# Patient Record
Sex: FEMALE | Race: WHITE | Employment: OTHER | URBAN - METROPOLITAN AREA
[De-identification: names, ages, dates, MRNs, and addresses within clinical notes are randomized per-mention and may not be internally consistent; named-entity substitution may affect disease eponyms.]

---

## 2017-04-12 PROBLEM — D17.21 LIPOMA OF RIGHT UPPER EXTREMITY: Status: ACTIVE | Noted: 2017-04-12

## 2017-04-12 PROBLEM — L98.9 SKIN ABNORMALITIES: Status: ACTIVE | Noted: 2017-04-12

## 2017-04-12 PROBLEM — I10 ESSENTIAL HYPERTENSION WITH GOAL BLOOD PRESSURE LESS THAN 130/85: Status: ACTIVE | Noted: 2017-04-12

## 2017-04-13 ENCOUNTER — HOSPITAL ENCOUNTER (OUTPATIENT)
Dept: MAMMOGRAPHY | Age: 72
Discharge: HOME | End: 2017-04-13
Attending: INTERNAL MEDICINE
Payer: MEDICARE

## 2017-04-13 DIAGNOSIS — Z12.31 VISIT FOR SCREENING MAMMOGRAM: ICD-10-CM

## 2017-04-13 DIAGNOSIS — Z80.3 FAMILY HISTORY OF BREAST CANCER IN FIRST DEGREE RELATIVE: ICD-10-CM

## 2017-04-13 PROCEDURE — 77067 SCR MAMMO BI INCL CAD: CPT

## 2017-06-13 ENCOUNTER — APPOINTMENT (RX ONLY)
Dept: URBAN - METROPOLITAN AREA CLINIC 23 | Facility: CLINIC | Age: 72
Setting detail: DERMATOLOGY
End: 2017-06-13

## 2017-06-13 DIAGNOSIS — D18.0 HEMANGIOMA: ICD-10-CM

## 2017-06-13 DIAGNOSIS — L82.1 OTHER SEBORRHEIC KERATOSIS: ICD-10-CM

## 2017-06-13 DIAGNOSIS — D22 MELANOCYTIC NEVI: ICD-10-CM

## 2017-06-13 DIAGNOSIS — D49.2 NEOPLASM OF UNSPECIFIED BEHAVIOR OF BONE, SOFT TISSUE, AND SKIN: ICD-10-CM

## 2017-06-13 PROBLEM — E78.5 HYPERLIPIDEMIA, UNSPECIFIED: Status: ACTIVE | Noted: 2017-06-13

## 2017-06-13 PROBLEM — D18.01 HEMANGIOMA OF SKIN AND SUBCUTANEOUS TISSUE: Status: ACTIVE | Noted: 2017-06-13

## 2017-06-13 PROBLEM — D22.61 MELANOCYTIC NEVI OF RIGHT UPPER LIMB, INCLUDING SHOULDER: Status: ACTIVE | Noted: 2017-06-13

## 2017-06-13 PROBLEM — L85.3 XEROSIS CUTIS: Status: ACTIVE | Noted: 2017-06-13

## 2017-06-13 PROBLEM — L55.1 SUNBURN OF SECOND DEGREE: Status: ACTIVE | Noted: 2017-06-13

## 2017-06-13 PROBLEM — D22.39 MELANOCYTIC NEVI OF OTHER PARTS OF FACE: Status: ACTIVE | Noted: 2017-06-13

## 2017-06-13 PROBLEM — I10 ESSENTIAL (PRIMARY) HYPERTENSION: Status: ACTIVE | Noted: 2017-06-13

## 2017-06-13 PROBLEM — D23.71 OTHER BENIGN NEOPLASM OF SKIN OF RIGHT LOWER LIMB, INCLUDING HIP: Status: ACTIVE | Noted: 2017-06-13

## 2017-06-13 PROBLEM — E03.9 HYPOTHYROIDISM, UNSPECIFIED: Status: ACTIVE | Noted: 2017-06-13

## 2017-06-13 PROBLEM — J30.1 ALLERGIC RHINITIS DUE TO POLLEN: Status: ACTIVE | Noted: 2017-06-13

## 2017-06-13 PROCEDURE — ? BIOPSY BY SHAVE METHOD

## 2017-06-13 PROCEDURE — ? OTHER

## 2017-06-13 PROCEDURE — 11100: CPT

## 2017-06-13 PROCEDURE — ? COUNSELING

## 2017-06-13 PROCEDURE — 99202 OFFICE O/P NEW SF 15 MIN: CPT | Mod: 25

## 2017-06-13 ASSESSMENT — LOCATION DETAILED DESCRIPTION DERM
LOCATION DETAILED: LEFT SUPERIOR LATERAL NECK
LOCATION DETAILED: RIGHT MEDIAL UPPER BACK
LOCATION DETAILED: RIGHT POSTERIOR ANKLE
LOCATION DETAILED: RIGHT ANTERIOR DISTAL UPPER ARM
LOCATION DETAILED: PERIUMBILICAL SKIN
LOCATION DETAILED: LEFT ANTERIOR DISTAL UPPER ARM
LOCATION DETAILED: RIGHT DISTAL POSTERIOR UPPER ARM
LOCATION DETAILED: STERNAL NOTCH
LOCATION DETAILED: LEFT DISTAL POSTERIOR UPPER ARM
LOCATION DETAILED: RIGHT LATERAL TRAPEZIAL NECK
LOCATION DETAILED: LEFT MID-UPPER BACK
LOCATION DETAILED: LEFT POSTERIOR ANKLE
LOCATION DETAILED: GLABELLA
LOCATION DETAILED: LEFT INFERIOR MEDIAL MIDBACK

## 2017-06-13 ASSESSMENT — LOCATION SIMPLE DESCRIPTION DERM
LOCATION SIMPLE: LEFT UPPER ARM
LOCATION SIMPLE: RIGHT POSTERIOR UPPER ARM
LOCATION SIMPLE: CHEST
LOCATION SIMPLE: RIGHT UPPER ARM
LOCATION SIMPLE: LEFT LOWER BACK
LOCATION SIMPLE: GLABELLA
LOCATION SIMPLE: ABDOMEN
LOCATION SIMPLE: POSTERIOR NECK
LOCATION SIMPLE: LEFT ANKLE
LOCATION SIMPLE: NECK
LOCATION SIMPLE: LEFT UPPER BACK
LOCATION SIMPLE: RIGHT ANKLE
LOCATION SIMPLE: RIGHT UPPER BACK
LOCATION SIMPLE: LEFT POSTERIOR UPPER ARM

## 2017-06-13 ASSESSMENT — LOCATION ZONE DERM
LOCATION ZONE: ARM
LOCATION ZONE: TRUNK
LOCATION ZONE: NECK
LOCATION ZONE: LEG
LOCATION ZONE: FACE

## 2017-06-13 NOTE — PROCEDURE: OTHER
Detail Level: Zone
Other (Free Text): Offered biopsy today, pt declined. She has been using curling iron. She wanted to do wound care w vaseline. If not resolved in a month, she is to call and schedule biopsy
Note Text (......Xxx Chief Complaint.): This diagnosis correlates with the
Other (Free Text): Laser removal with Coco if patient desires

## 2017-06-13 NOTE — PROCEDURE: BIOPSY BY SHAVE METHOD
Bill 89579 For Specimen Handling/Conveyance To Laboratory?: no
Anesthesia Type: 1% lidocaine with 1:200,000 epinephrine and a 1:10 solution of 8.4% sodium bicarbonate
Notification Instructions: Patient will be notified of biopsy results. However, patient instructed to call the office if not contacted within 2 weeks.
Post-Care Instructions: I reviewed with the patient in detail post-care instructions. Patient is to keep the biopsy site dry overnight, and then apply vaseline twice daily until healed. Patient may apply hydrogen peroxide soaks to remove any crusting. Patient given a wound care sheet.
Wound Care: Vaseline
Additional Anesthesia Volume In Cc (Will Not Render If 0): 0
Render Post-Care Instructions In Note?: yes
Biopsy Method: Personna blade
Cryotherapy Text: The wound bed was treated with cryotherapy after the biopsy was performed.
Anesthesia Volume In Cc: 0.1
Dressing: bandage
Detail Level: Detailed
Curettage Text: The wound bed was treated with curettage after the biopsy was performed.
Type Of Destruction Used: Curettage
Electrodesiccation Text: The wound bed was treated with electrodesiccation after the biopsy was performed.
Silver Nitrate Text: The wound bed was treated with silver nitrate after the biopsy was performed.
Electrodesiccation And Curettage Text: The wound bed was treated with electrodesiccation and curettage after the biopsy was performed.
Hemostasis: Aluminum Chloride
Accession #: [id number]
Billing Type: Third-Party Bill
Biopsy Type: H and E
Consent: Written consent was obtained and risks were reviewed including but not limited to scarring, infection, bleeding, scabbing, incomplete removal, nerve damage and allergy to anesthesia.

## 2017-10-11 PROBLEM — I10 ESSENTIAL HYPERTENSION WITH GOAL BLOOD PRESSURE LESS THAN 130/85: Status: RESOLVED | Noted: 2017-04-12 | Resolved: 2017-10-11

## 2017-10-11 PROBLEM — D17.21 LIPOMA OF RIGHT UPPER EXTREMITY: Status: RESOLVED | Noted: 2017-04-12 | Resolved: 2017-10-11

## 2017-10-11 PROBLEM — L98.9 SKIN ABNORMALITIES: Status: RESOLVED | Noted: 2017-04-12 | Resolved: 2017-10-11

## 2018-04-17 PROBLEM — F51.04 CHRONIC INSOMNIA: Status: ACTIVE | Noted: 2018-04-17

## 2018-04-17 PROBLEM — I10 ESSENTIAL HYPERTENSION WITH GOAL BLOOD PRESSURE LESS THAN 130/85: Status: ACTIVE | Noted: 2018-04-17

## 2018-04-17 PROBLEM — N32.81 OAB (OVERACTIVE BLADDER): Status: ACTIVE | Noted: 2018-04-17

## 2019-11-22 ENCOUNTER — HOSPITAL ENCOUNTER (OUTPATIENT)
Dept: MAMMOGRAPHY | Age: 74
Discharge: HOME | End: 2019-11-22
Attending: INTERNAL MEDICINE
Payer: MEDICARE

## 2019-11-22 DIAGNOSIS — Z12.31 VISIT FOR SCREENING MAMMOGRAM: ICD-10-CM

## 2019-11-22 PROCEDURE — 77063 BREAST TOMOSYNTHESIS BI: CPT

## 2019-11-25 NOTE — PROGRESS NOTES
Please make sure patient has additional imaging scheduled. I will also place an order for breast mri to be done as well.       Hayes Landry MD

## 2019-12-02 ENCOUNTER — HOSPITAL ENCOUNTER (OUTPATIENT)
Dept: MAMMOGRAPHY | Age: 74
Discharge: HOME | End: 2019-12-02
Attending: INTERNAL MEDICINE
Payer: MEDICARE

## 2019-12-02 DIAGNOSIS — R92.8 ABNORMAL SCREENING MAMMOGRAM: ICD-10-CM

## 2019-12-02 DIAGNOSIS — Z80.3 FH: BREAST CANCER: ICD-10-CM

## 2019-12-02 PROCEDURE — 76642 ULTRASOUND BREAST LIMITED: CPT

## 2019-12-02 PROCEDURE — 77065 DX MAMMO INCL CAD UNI: CPT

## 2019-12-09 ENCOUNTER — HOSPITAL ENCOUNTER (OUTPATIENT)
Dept: MAMMOGRAPHY | Age: 74
Discharge: HOME | End: 2019-12-09
Attending: INTERNAL MEDICINE
Payer: MEDICARE

## 2019-12-09 VITALS — HEART RATE: 68 BPM | DIASTOLIC BLOOD PRESSURE: 84 MMHG | SYSTOLIC BLOOD PRESSURE: 170 MMHG

## 2019-12-09 DIAGNOSIS — R92.8 ABNORMAL MAMMOGRAM: ICD-10-CM

## 2019-12-09 DIAGNOSIS — N63.10 BREAST MASS, RIGHT: ICD-10-CM

## 2019-12-09 DIAGNOSIS — R92.8 ABNORMAL FINDING ON MAMMOGRAPHY: ICD-10-CM

## 2019-12-09 PROCEDURE — 88305 TISSUE EXAM BY PATHOLOGIST: CPT

## 2019-12-09 PROCEDURE — 88361 TUMOR IMMUNOHISTOCHEM/COMPUT: CPT

## 2019-12-09 PROCEDURE — 19084 BX BREAST ADD LESION US IMAG: CPT

## 2019-12-09 PROCEDURE — 74011000250 HC RX REV CODE- 250: Performed by: INTERNAL MEDICINE

## 2019-12-09 PROCEDURE — 77065 DX MAMMO INCL CAD UNI: CPT

## 2019-12-09 PROCEDURE — 19083 BX BREAST 1ST LESION US IMAG: CPT

## 2019-12-09 RX ORDER — LIDOCAINE HYDROCHLORIDE 10 MG/ML
7 INJECTION INFILTRATION; PERINEURAL
Status: COMPLETED | OUTPATIENT
Start: 2019-12-09 | End: 2019-12-09

## 2019-12-09 RX ADMIN — LIDOCAINE HYDROCHLORIDE 7 ML: 10 INJECTION, SOLUTION INFILTRATION; PERINEURAL at 10:03

## 2019-12-11 NOTE — PROGRESS NOTES
Dr. Nkechi Kraft and I met with Mrs. Roxann Chung and her  to give her results to her of her right breast biopsy. Results came back as DCIS of one area and second area is a papilloma. She will have her MRI on 12/19 at 11 and see Dr. Hipolito Doshi on 12/30 at 845. She was doing well after the biopsy and was given a breast cancer folder with her appts and other information along with the navigators phone number.

## 2019-12-19 ENCOUNTER — HOSPITAL ENCOUNTER (OUTPATIENT)
Dept: MRI IMAGING | Age: 74
Discharge: HOME | End: 2019-12-19
Attending: INTERNAL MEDICINE
Payer: MEDICARE

## 2019-12-19 DIAGNOSIS — R92.8 MAMMOGRAM ABNORMAL: ICD-10-CM

## 2019-12-19 DIAGNOSIS — Z80.3 FAMILY HISTORY OF BREAST CANCER IN FIRST DEGREE RELATIVE: ICD-10-CM

## 2019-12-19 DIAGNOSIS — Z12.39 BREAST CANCER SCREENING, HIGH RISK PATIENT: ICD-10-CM

## 2019-12-19 PROCEDURE — 74011000258 HC RX REV CODE- 258: Performed by: INTERNAL MEDICINE

## 2019-12-19 PROCEDURE — 74011250636 HC RX REV CODE- 250/636: Performed by: INTERNAL MEDICINE

## 2019-12-19 PROCEDURE — 77049 MRI BREAST C-+ W/CAD BI: CPT

## 2019-12-19 PROCEDURE — A9575 INJ GADOTERATE MEGLUMI 0.1ML: HCPCS | Performed by: INTERNAL MEDICINE

## 2019-12-19 RX ORDER — GADOTERATE MEGLUMINE 376.9 MG/ML
16 INJECTION INTRAVENOUS
Status: COMPLETED | OUTPATIENT
Start: 2019-12-19 | End: 2019-12-19

## 2019-12-19 RX ORDER — SODIUM CHLORIDE 0.9 % (FLUSH) 0.9 %
10 SYRINGE (ML) INJECTION
Status: COMPLETED | OUTPATIENT
Start: 2019-12-19 | End: 2019-12-19

## 2019-12-19 RX ADMIN — SODIUM CHLORIDE 100 ML: 900 INJECTION, SOLUTION INTRAVENOUS at 11:14

## 2019-12-19 RX ADMIN — Medication 10 ML: at 11:14

## 2019-12-19 RX ADMIN — GADOTERATE MEGLUMINE 16 ML: 376.9 INJECTION INTRAVENOUS at 11:14

## 2019-12-30 PROBLEM — D05.11 DUCTAL CARCINOMA IN SITU (DCIS) OF RIGHT BREAST: Status: ACTIVE | Noted: 2019-12-30

## 2019-12-30 PROBLEM — D24.1 INTRADUCTAL PAPILLOMA OF RIGHT BREAST: Status: ACTIVE | Noted: 2019-12-30

## 2019-12-31 RX ORDER — SODIUM CHLORIDE 0.9 % (FLUSH) 0.9 %
5-40 SYRINGE (ML) INJECTION AS NEEDED
Status: CANCELLED | OUTPATIENT
Start: 2019-12-31

## 2019-12-31 RX ORDER — SODIUM CHLORIDE 0.9 % (FLUSH) 0.9 %
5-40 SYRINGE (ML) INJECTION EVERY 8 HOURS
Status: CANCELLED | OUTPATIENT
Start: 2019-12-31

## 2020-01-14 ENCOUNTER — HOSPITAL ENCOUNTER (OUTPATIENT)
Dept: LAB | Age: 75
Discharge: HOME | End: 2020-01-14
Payer: MEDICARE

## 2020-01-14 DIAGNOSIS — D05.11 DUCTAL CARCINOMA IN SITU (DCIS) OF RIGHT BREAST: ICD-10-CM

## 2020-01-14 DIAGNOSIS — Z85.3 PERSONAL HISTORY OF BREAST CANCER: ICD-10-CM

## 2020-01-14 DIAGNOSIS — Z00.6 RESEARCH EXAM: ICD-10-CM

## 2020-01-14 DIAGNOSIS — Z80.3 FAMILY HISTORY OF BREAST CANCER: ICD-10-CM

## 2020-01-14 LAB
Lab: NORMAL
Lab: NORMAL
REFERENCE LAB,REFLB: NORMAL
REFERENCE LAB,REFLB: NORMAL
TEST DESCRIPTION:,ATST: NORMAL
TEST DESCRIPTION:,ATST: NORMAL

## 2020-01-14 PROCEDURE — 36415 COLL VENOUS BLD VENIPUNCTURE: CPT

## 2020-02-19 RX ORDER — SODIUM CHLORIDE 0.9 % (FLUSH) 0.9 %
5-40 SYRINGE (ML) INJECTION EVERY 8 HOURS
Status: CANCELLED | OUTPATIENT
Start: 2020-02-19

## 2020-02-19 RX ORDER — SODIUM CHLORIDE 0.9 % (FLUSH) 0.9 %
5-40 SYRINGE (ML) INJECTION AS NEEDED
Status: CANCELLED | OUTPATIENT
Start: 2020-02-19

## 2020-02-26 ENCOUNTER — HOSPITAL ENCOUNTER (OUTPATIENT)
Dept: SURGERY | Age: 75
Discharge: HOME | End: 2020-02-26

## 2020-03-02 VITALS — HEIGHT: 70 IN | WEIGHT: 170 LBS | BODY MASS INDEX: 24.34 KG/M2

## 2020-03-02 NOTE — PERIOP NOTES
Patient verified name and . Order for consent found in EHR and matches case posting; patient verifies procedure. RIGHT BREAST LUMPECTOMY WITH NEEDLE LOCALIZATION X 2    Type 1B surgery, PAT phone assessment complete. Orders received. Labs per surgeon: none. Labs per anesthesia protocol: none. Patient answered medical/surgical history questions at their best of ability. All prior to admission medications documented in [facility]. Patient instructed to take the following medications the day of surgery according to anesthesia guidelines with a small sip of water: none. Hold all vitamins/supplements 7 days prior to surgery and NSAIDS 5 days prior to surgery. Prescription meds to hold:ibuprofen. Patient instructed on the following:  Arrive at A Entrance, time of arrival to be called the day before by 1700  NPO after midnight including gum, mints, and ice chips  Responsible adult must drive patient to the hospital, stay during surgery, and patient will need supervision 24 hours after anesthesia  Use antibacterial soap in shower the night before surgery and on the morning of surgery  All piercings must be removed prior to arrival.    Leave all valuables (money and jewelry) at home but bring insurance card and ID on  DOS. Do not wear make-up, nail polish, lotions, cologne, perfumes, powders, or oil on skin. Patient teach back successful and patient demonstrates knowledge of instruction.

## 2020-03-03 ENCOUNTER — ANESTHESIA EVENT (OUTPATIENT)
Dept: SURGERY | Age: 75
End: 2020-03-03
Payer: MEDICARE

## 2020-03-03 NOTE — H&P
H&P/Consult Note/Progress Note/Office Note:   Ayo Garcia  MRN: [id number]  :1945  Age:74 y.o.    HPI: Ayo Garcia is a 69 y.o. female who is here for right breast partial masectomy (NL lumpectomy) for DCIS at 9:00 and a separate NL lumpectomy at 8:00 for a separate papilloma on 3/4/20. She presented with an abnormal right breast screening mammogram with an asymmetry which led to further diagnostic imaging   and 2 percutaneous biopsies identifying right breast DCIS and a separate right breast papilloma. This is shown in the expanded problem list below and was followed by breast MRI. No current breast complaints.   She is s/p benign left breast biopsy many yrs ago at another facility        She has a family history significant for breast carcinoma (mother 67; sister 62)  20 Ayo Garcia genetic testing            Past Medical History:   Diagnosis Date    Abnormal LFTs (liver function tests)     Breast cancer ([address]     Right     Dyslipidemia     Hyperlipidemia     Hypertension     controlled with meds    Hypothyroidism     levothyroxine daily    Urgency of urination     Varicose veins 12     Past Surgical History:   Procedure Laterality Date    BREAST SURGERY PROCEDURE UNLISTED      cysts removed x 2 left breast- benign    HX BREAST BIOPSY Right 2019    2 areas    HX COLONOSCOPY  12/10/2012 done    [facility]---7-10 yr recall    HX DILATION AND CURETTAGE      HX TONSIL AND ADENOIDECTOMY  as child    HX TUBAL LIGATION      VASCULAR SURGERY PROCEDURE UNLIST      Varicose vein removal     Current Facility-Administered Medications   Medication Dose Route Frequency    lidocaine (XYLOCAINE) 10 mg/mL (1 %) injection 0.1 mL  0.1 mL SubCUTAneous PRN    lactated Ringers infusion  75 mL/hr IntraVENous CONTINUOUS    fentaNYL citrate (PF) injection 100 mcg  100 mcg IntraVENous ONCE    midazolam (VERSED) injection 2 mg  2 mg IntraVENous ONCE    midazolam (VERSED) injection 2 mg  2 mg IntraVENous ONCE    ceFAZolin (ANCEF) 2 g/20 mL in sterile water IV syringe  2 g IntraVENous ONCE    sodium chloride (NS) flush 5-40 mL  5-40 mL IntraVENous Q8H    sodium chloride (NS) flush 5-40 mL  5-40 mL IntraVENous PRN     Patient has no known allergies. Social History     Socioeconomic History    Marital status:      Spouse name: Not on file    Number of children: Not on file    Years of education: Not on file    Highest education level: Not on file   Tobacco Use    Smoking status: Former Smoker     Packs/day: 0.50     Years: 20.00     Pack years: 10.00     Last attempt to quit: 1990     Years since quittin.1    Smokeless tobacco: Never Used   Substance and Sexual Activity    Alcohol use: No     Comment: rarely    Drug use: No    Sexual activity: Yes     Partners: Female, Male     Social History     Tobacco Use   Smoking Status Former Smoker    Packs/day: 0.50    Years: 20.00    Pack years: 10.00    Last attempt to quit: 1990    Years since quittin.1   Smokeless Tobacco Never Used     Family History   Problem Relation Age of Onset    Cancer Mother         breast and rectal cancer, passed at age 80    Stroke Mother     Diabetes Mother     Breast Cancer Mother 67    Heart Disease Father 50        mi- massive    Heart Attack Father     Cancer Sister         breast    Breast Cancer Sister 62    Heart Disease Brother         mi    Hypertension Brother     Diabetes Brother      ROS: The patient has no difficulty with chest pain or shortness of breath. No fever or chills. Comprehensive review of systems was otherwise unremarkable except as noted above.     Physical Exam:   Visit Vitals  /80   Pulse 64   Temp 97.7 °F (36.5 °C)   Resp 17   Wt 172 lb 4.8 oz (78.2 kg)   SpO2 96%   BMI 24.72 kg/m²     Vitals:    20 0902   BP: 150/80   Pulse: 64   Resp: 17   Temp: 97.7 °F (36.5 °C)   SpO2: 96%   Weight: 172 lb 4.8 oz (78.2 kg) [unfilled]  [unfilled]    Constitutional: Alert, oriented, cooperative patient in no acute distress; appears stated age    Eyes:Sclera are clear. EOMs intact  ENMT: no external lesions gross hearing normal; no obvious neck masses, no ear or lip lesions, nares normal  CV: RRR. Normal perfusion  Resp: No JVD. Breathing is  non-labored; no audible wheezing. No palpable breast masses on either side  No regional adenopathy     GI: soft and non-distended     Musculoskeletal: unremarkable with normal function. No embolic signs or cyanosis. Neuro:  Oriented; moves all 4; no focal deficits  Psychiatric: normal affect and mood, no memory impairment    Recent vitals (if inpt):  @IPVITALS(24:)@    Labs:  No results for input(s): WBC, HGB, PLT, NA, K, CL, CO2, BUN, CREA, GLU, PTP, INR, APTT, TBIL, TBILI, CBIL, SGOT, GPT, ALT, AP, AML, AML, LPSE, LCAD, NH4, TROPT, TROIQ, PCO2, PO2, HCO3, HGBEXT, PLTEXT, INREXT, HGBEXT, PLTEXT, INREXT in the last 72 hours. No lab exists for component:  PH    Lab Results   Component Value Date/Time    WBC 7.3 10/25/2019 08:04 AM    HGB 15.6 10/25/2019 08:04 AM    PLATELET 545 80/23/4596 08:04 AM    Sodium 142 10/25/2019 08:04 AM    Potassium 4.4 10/25/2019 08:04 AM    Chloride 101 10/25/2019 08:04 AM    CO2 28 10/25/2019 08:04 AM    BUN 14 10/25/2019 08:04 AM    Creatinine 0.92 10/25/2019 08:04 AM    Glucose 91 10/25/2019 08:04 AM    Bilirubin, total 0.7 10/25/2019 08:04 AM    Bilirubin, direct 0.17 09/17/2013 10:10 AM    AST (SGOT) 20 10/25/2019 08:04 AM    ALT (SGPT) 20 10/25/2019 08:04 AM    Alk. phosphatase 82 10/25/2019 08:04 AM       CT Results  (Last 48 hours)    None        chest X-ray      I reviewed recent labs, recent radiologic studies, and pertinent records including other doctor notes if needed. I independently reviewed radiology images for studies I described above or studies I have ordered.    Admission date (for inpatients): 3/4/2020   [unfilled] [unfilled]    ASSESSMENT/PLAN:  Problem List  Date Reviewed: 12/30/2019          Codes Class Noted    Ductal carcinoma in situ (DCIS) of right breast ICD-10-CM: D05.11  ICD-9-CM: 233.0  12/30/2019    Overview Addendum 1/2/2020  7:01 AM by MD Edith Suazois, Right Breast DCIS      11/22/19 bilat screening mammo  Hx:   Remote benign left biopsy and a strong family history of breast cancer   give the patient a persistent lifetime risk of 20% by the Adonna Fend model.     Heterogeneously dense fibroglandular tissue bilaterally. Superficial asymmetry posteriorly in the  right upper outer quadrant and a small nodular asymmetry centrally in the right  lower outer quadrant appear new from earlier studies and are incompletely  evaluated on these standard views. No other definite evidence for malignancy  is seen elsewhere in either breast.     IMPRESSION:       1.  RIGHT POSTERIOR UPPER OUTER QUADRANT SUPERFICIAL ASYMMETRY AND LOWER OUTER  QUADRANT CENTRAL NODULAR ASYMMETRY SHOULD BE FURTHER EVALUATED WITH STRAIGHT  LATERAL AND SPOT COMPRESSION VIEWS AS WELL AS POSSIBLE ULTRASOUND AT THIS TIME.     2. HIGH-RISK SCREENING BREAST MRI SHOULD ALSO BE CONSIDERED AT THIS TIME IN  FOLLOWUP OF THE PRIOR STUDY IN 2014 FOR THIS PATIENT WITH DENSE BREAST  PARENCHYMA AND A PERSISTENT 20% LIFETIME BREAST CANCER RISK (SEE ABOVE    12/2/19 right breast dx mammo and US  RIGHT MAMMOGRAM:  Straight lateral and spot compression views are compared with  November 22, 2019 and earlier studies including MRI of December 22, 2014. They  confirm the presence of a small nodule at the 8:00 position 4 cm from the nipple  at mid-depth from the skin. There is also focal asymmetry superficially at the  posterior 9:00 position which persists with spot compression.     RIGHT BREAST ULTRASOUND:    6 mm microlobulated hypoechoic nodule at the 8:00 position 4 cm from the nipple. It is taller than wide, which is suspicious for malignancy.   Biopsy is indicated. At the 9:00 position 6 cm from the nipple, there is an 8 mm lobular hypoechoic nodule   in the superficial fat associated with subtle acoustical shadowing. It appears new from prior mammogram in 2017, and the possibility of malignancy is suggested. Biopsy is also indicated.     IMPRESSION:    SUBCENTIMETER 8:00 AND SUPERFICIAL POSTERIOR 9:00 NODULES ARE SUSPICIOUS FOR  MALIGNANCY, AND BIOPSIES ARE RECOMMENDED.         12/9/19 US-guided right breast biopsy x2   DIAGNOSIS   A: \"RIGHT BREAST, 9:00 POSITION, 6 CM FROM NIPPLE, CORE BIOPSY\":   DUCTAL CARCINOMA IN SITU, INTERMEDIATE GRADE (CRIBRIFORM TYPE)   B: \"RIGHT BREAST, 8:00 POSITION, 4 CM FROM NIPPLE, CORE BIOPSY\":   FIBROCYSTIC MASTOPATHY HAVING FOCALLY ATYPICAL INTRADUCTAL PAPILLOMA   Electronically signed out on 12/10/2019 06:58 by NADRY Batista M.D.   ER: Positive (100%); MD: Positive (99%)   See full report in [facility] as errors can occur when results are embedded into this report. Preliminary ultrasound evaluation of the right axilla demonstrated no pathologically enlarged or dysmorphic nodes. Post-bx mammo demonstrates the biopsy marker clips in expected positions. 12/19/19 Breast MRI  The breasts demonstrate moderate background glandularity and mild enhancement. Innumerable tiny scattered enhancing foci are noted on each side.     Right biopsy clip artifact is noted at 8:00 middle depth and 9:00 posterior depth. The 8:00 slight demonstrates only minimal enhancement surrounding the biopsy site.    At 9:00 there is 1.2 cm of clumped nonmass enhancement consistent with DCIS.     There is no other evidence of suspicious enhancing mass or dominant nonmass  enhancement to suggest malignancy elsewhere in either breast.   No evidence of axillary or internal mammary lymphadenopathy.     Elsewhere, limited visualization of the partially included thorax and upper  abdomen shows no concerning findings.      IMPRESSION: 1. Right 9:00 DCIS site demonstrates 1.2 cm of enhancement surrounding the clip. 2. Right 8:00 atypical papilloma site demonstrates only minimal residual enhancement. 3. No suspicious finding otherwise in either breast. No lymphadenopathy.     This patient would benefit from yearly supplemental MRI in the future, as long as she is able,  given increased risk and dense breast parenchyma. 1/2/20 presented at [facility]; offer genetics referral; Right lumpectomy x 2 (DCIS and papilloma biopsy site) unless genetics done and + for mutation                     Intraductal papilloma of right breast ICD-10-CM: D24.1  ICD-9-CM: 217  12/30/2019        Chronic insomnia ICD-10-CM: F51.04  ICD-9-CM: 780.52  4/17/2018        OAB (overactive bladder) ICD-10-CM: N32.81  ICD-9-CM: 596.51  4/17/2018        Essential hypertension with goal blood pressure less than 130/85 ICD-10-CM: I10  ICD-9-CM: 401.9  4/17/2018        Family history of breast cancer in first degree relative ICD-10-CM: Z80.3  ICD-9-CM: V16.3  4/3/2014        Hypertension ICD-10-CM: I10  ICD-9-CM: 401.9  Unknown    Overview Signed 7/12/2013  1:23 PM by Cherie Berrios RN     controlled with meds             Hypothyroidism ICD-10-CM: E03.9  ICD-9-CM: 244.9  Unknown    Overview Signed 7/12/2013  1:23 PM by Cherie Berrios RN     levothyroxine daily             Dyslipidemia ICD-10-CM: E78.5  ICD-9-CM: 272.4  Unknown            Active Problems:    * No active hospital problems. *           She is here for right breast partial masectomy (NL lumpectomy) for DCIS at 9:00   as well as a separate NL lumpectomy at 8:00 for a separate papilloma on 3/4/20. Right Breast DCIS and separate right breast papilloma  We had a long discussion in the office today about her 2 conditions. We discussed treatment options. We discussed mastectomy/sent node vs partial mastectomy/XRT  We discussed risks/benefits and advantages/disadvantages of each.   She wants breast preservation surgery. We plan breast preservation surgery right breast partial mastectomy using NL for the right breast DCIS   and separate NL lumpectomy for the papilloma biopsy site. We discussed that if invasive breast carcinoma is identified in either excision site, she will need subsequent sent node excision                  I have personally performed a face-to-face diagnostic evaluation and management  service on this patient. I have independently seen the patient. I have independently obtained the above history from the patient/family. I have independently examined the patient with above findings. I have independently reviewed data/labs for this patient and developed the above plan of care (MDM). Signed: Miguel Ángel Molina.  Ace Pandey MD, FACS

## 2020-03-04 ENCOUNTER — HOSPITAL ENCOUNTER (OUTPATIENT)
Age: 75
Setting detail: OUTPATIENT SURGERY
Discharge: HOME | End: 2020-03-04
Attending: SURGERY | Admitting: SURGERY
Payer: MEDICARE

## 2020-03-04 ENCOUNTER — ANESTHESIA (OUTPATIENT)
Dept: SURGERY | Age: 75
End: 2020-03-04
Payer: MEDICARE

## 2020-03-04 ENCOUNTER — APPOINTMENT (OUTPATIENT)
Dept: MAMMOGRAPHY | Age: 75
End: 2020-03-04
Attending: SURGERY
Payer: MEDICARE

## 2020-03-04 VITALS
WEIGHT: 172.3 LBS | RESPIRATION RATE: 16 BRPM | SYSTOLIC BLOOD PRESSURE: 159 MMHG | OXYGEN SATURATION: 97 % | BODY MASS INDEX: 24.72 KG/M2 | TEMPERATURE: 97.5 F | HEART RATE: 62 BPM | DIASTOLIC BLOOD PRESSURE: 76 MMHG

## 2020-03-04 DIAGNOSIS — N63.10 BREAST MASS, RIGHT: ICD-10-CM

## 2020-03-04 DIAGNOSIS — D05.11 DUCTAL CARCINOMA IN SITU (DCIS) OF RIGHT BREAST: ICD-10-CM

## 2020-03-04 PROCEDURE — 77030020782 HC GWN BAIR PAWS FLX 3M -B: Performed by: ANESTHESIOLOGY

## 2020-03-04 PROCEDURE — 76010000160 HC OR TIME 0.5 TO 1 HR INTENSV-TIER 1: Performed by: SURGERY

## 2020-03-04 PROCEDURE — 74011250636 HC RX REV CODE- 250/636: Performed by: ANESTHESIOLOGY

## 2020-03-04 PROCEDURE — 19286 PERQ DEV BREAST ADD US IMAG: CPT

## 2020-03-04 PROCEDURE — 74011000250 HC RX REV CODE- 250: Performed by: SURGERY

## 2020-03-04 PROCEDURE — 76210000020 HC REC RM PH II FIRST 0.5 HR: Performed by: SURGERY

## 2020-03-04 PROCEDURE — 19285 PERQ DEV BREAST 1ST US IMAG: CPT

## 2020-03-04 PROCEDURE — 77065 DX MAMMO INCL CAD UNI: CPT

## 2020-03-04 PROCEDURE — 77030018836 HC SOL IRR NACL ICUM -A: Performed by: SURGERY

## 2020-03-04 PROCEDURE — 74011250637 HC RX REV CODE- 250/637: Performed by: ANESTHESIOLOGY

## 2020-03-04 PROCEDURE — 74011250636 HC RX REV CODE- 250/636: Performed by: NURSE ANESTHETIST, CERTIFIED REGISTERED

## 2020-03-04 PROCEDURE — 77030008462 HC STPLR SKN PROX J&J -A: Performed by: SURGERY

## 2020-03-04 PROCEDURE — 77030010509 HC AIRWY LMA MSK TELE -A: Performed by: ANESTHESIOLOGY

## 2020-03-04 PROCEDURE — 77030003029 HC SUT VCRL J&J -B: Performed by: SURGERY

## 2020-03-04 PROCEDURE — 88307 TISSUE EXAM BY PATHOLOGIST: CPT

## 2020-03-04 PROCEDURE — 76210000063 HC OR PH I REC FIRST 0.5 HR: Performed by: SURGERY

## 2020-03-04 PROCEDURE — 77030040361 HC SLV COMPR DVT MDII -B: Performed by: SURGERY

## 2020-03-04 PROCEDURE — 76060000032 HC ANESTHESIA 0.5 TO 1 HR: Performed by: SURGERY

## 2020-03-04 PROCEDURE — 77030010512 HC APPL CLP LIG J&J -C: Performed by: SURGERY

## 2020-03-04 PROCEDURE — 88305 TISSUE EXAM BY PATHOLOGIST: CPT

## 2020-03-04 PROCEDURE — 74011000250 HC RX REV CODE- 250: Performed by: NURSE ANESTHETIST, CERTIFIED REGISTERED

## 2020-03-04 PROCEDURE — 74011250636 HC RX REV CODE- 250/636: Performed by: SURGERY

## 2020-03-04 RX ORDER — PROPOFOL 10 MG/ML
INJECTION, EMULSION INTRAVENOUS AS NEEDED
Status: DISCONTINUED | OUTPATIENT
Start: 2020-03-04 | End: 2020-03-04 | Stop reason: HOSPADM

## 2020-03-04 RX ORDER — FAMOTIDINE 20 MG/1
20 TABLET, FILM COATED ORAL ONCE
Status: COMPLETED | OUTPATIENT
Start: 2020-03-04 | End: 2020-03-04

## 2020-03-04 RX ORDER — LIDOCAINE HYDROCHLORIDE 10 MG/ML
0.1 INJECTION INFILTRATION; PERINEURAL AS NEEDED
Status: DISCONTINUED | OUTPATIENT
Start: 2020-03-04 | End: 2020-03-04 | Stop reason: HOSPADM

## 2020-03-04 RX ORDER — SODIUM CHLORIDE 0.9 % (FLUSH) 0.9 %
5-40 SYRINGE (ML) INJECTION EVERY 8 HOURS
Status: DISCONTINUED | OUTPATIENT
Start: 2020-03-04 | End: 2020-03-04 | Stop reason: HOSPADM

## 2020-03-04 RX ORDER — SODIUM CHLORIDE, SODIUM LACTATE, POTASSIUM CHLORIDE, CALCIUM CHLORIDE 600; 310; 30; 20 MG/100ML; MG/100ML; MG/100ML; MG/100ML
75 INJECTION, SOLUTION INTRAVENOUS CONTINUOUS
Status: DISCONTINUED | OUTPATIENT
Start: 2020-03-04 | End: 2020-03-04 | Stop reason: HOSPADM

## 2020-03-04 RX ORDER — MIDAZOLAM HYDROCHLORIDE 1 MG/ML
2 INJECTION, SOLUTION INTRAMUSCULAR; INTRAVENOUS ONCE
Status: DISCONTINUED | OUTPATIENT
Start: 2020-03-04 | End: 2020-03-04 | Stop reason: HOSPADM

## 2020-03-04 RX ORDER — OXYCODONE HYDROCHLORIDE 5 MG/1
5 TABLET ORAL
Status: DISCONTINUED | OUTPATIENT
Start: 2020-03-04 | End: 2020-03-04 | Stop reason: HOSPADM

## 2020-03-04 RX ORDER — LIDOCAINE HYDROCHLORIDE 10 MG/ML
4 INJECTION INFILTRATION; PERINEURAL
Status: COMPLETED | OUTPATIENT
Start: 2020-03-04 | End: 2020-03-04

## 2020-03-04 RX ORDER — BUPIVACAINE HYDROCHLORIDE 2.5 MG/ML
INJECTION, SOLUTION EPIDURAL; INFILTRATION; INTRACAUDAL AS NEEDED
Status: DISCONTINUED | OUTPATIENT
Start: 2020-03-04 | End: 2020-03-04 | Stop reason: HOSPADM

## 2020-03-04 RX ORDER — HYDROMORPHONE HYDROCHLORIDE 2 MG/ML
0.5 INJECTION, SOLUTION INTRAMUSCULAR; INTRAVENOUS; SUBCUTANEOUS
Status: DISCONTINUED | OUTPATIENT
Start: 2020-03-04 | End: 2020-03-04 | Stop reason: HOSPADM

## 2020-03-04 RX ORDER — OXYCODONE HYDROCHLORIDE 5 MG/1
10 TABLET ORAL
Status: DISCONTINUED | OUTPATIENT
Start: 2020-03-04 | End: 2020-03-04 | Stop reason: HOSPADM

## 2020-03-04 RX ORDER — ONDANSETRON 2 MG/ML
INJECTION INTRAMUSCULAR; INTRAVENOUS AS NEEDED
Status: DISCONTINUED | OUTPATIENT
Start: 2020-03-04 | End: 2020-03-04 | Stop reason: HOSPADM

## 2020-03-04 RX ORDER — DEXAMETHASONE SODIUM PHOSPHATE 4 MG/ML
INJECTION, SOLUTION INTRA-ARTICULAR; INTRALESIONAL; INTRAMUSCULAR; INTRAVENOUS; SOFT TISSUE AS NEEDED
Status: DISCONTINUED | OUTPATIENT
Start: 2020-03-04 | End: 2020-03-04 | Stop reason: HOSPADM

## 2020-03-04 RX ORDER — LIDOCAINE HYDROCHLORIDE 20 MG/ML
INJECTION, SOLUTION EPIDURAL; INFILTRATION; INTRACAUDAL; PERINEURAL AS NEEDED
Status: DISCONTINUED | OUTPATIENT
Start: 2020-03-04 | End: 2020-03-04 | Stop reason: HOSPADM

## 2020-03-04 RX ORDER — SODIUM CHLORIDE 0.9 % (FLUSH) 0.9 %
5-40 SYRINGE (ML) INJECTION AS NEEDED
Status: DISCONTINUED | OUTPATIENT
Start: 2020-03-04 | End: 2020-03-04 | Stop reason: HOSPADM

## 2020-03-04 RX ORDER — CEFAZOLIN SODIUM/WATER 2 G/20 ML
2 SYRINGE (ML) INTRAVENOUS ONCE
Status: COMPLETED | OUTPATIENT
Start: 2020-03-04 | End: 2020-03-04

## 2020-03-04 RX ORDER — FENTANYL CITRATE 50 UG/ML
100 INJECTION, SOLUTION INTRAMUSCULAR; INTRAVENOUS ONCE
Status: DISCONTINUED | OUTPATIENT
Start: 2020-03-04 | End: 2020-03-04 | Stop reason: HOSPADM

## 2020-03-04 RX ORDER — FENTANYL CITRATE 50 UG/ML
INJECTION, SOLUTION INTRAMUSCULAR; INTRAVENOUS AS NEEDED
Status: DISCONTINUED | OUTPATIENT
Start: 2020-03-04 | End: 2020-03-04 | Stop reason: HOSPADM

## 2020-03-04 RX ADMIN — SODIUM CHLORIDE, SODIUM LACTATE, POTASSIUM CHLORIDE, AND CALCIUM CHLORIDE 75 ML/HR: 600; 310; 30; 20 INJECTION, SOLUTION INTRAVENOUS at 09:12

## 2020-03-04 RX ADMIN — SODIUM CHLORIDE, SODIUM LACTATE, POTASSIUM CHLORIDE, AND CALCIUM CHLORIDE: 600; 310; 30; 20 INJECTION, SOLUTION INTRAVENOUS at 13:46

## 2020-03-04 RX ADMIN — Medication 2 G: at 12:56

## 2020-03-04 RX ADMIN — FENTANYL CITRATE 100 MCG: 50 INJECTION INTRAMUSCULAR; INTRAVENOUS at 13:13

## 2020-03-04 RX ADMIN — DEXAMETHASONE SODIUM PHOSPHATE 10 MG: 4 INJECTION, SOLUTION INTRAMUSCULAR; INTRAVENOUS at 13:10

## 2020-03-04 RX ADMIN — ONDANSETRON 4 MG: 2 INJECTION INTRAMUSCULAR; INTRAVENOUS at 13:10

## 2020-03-04 RX ADMIN — FAMOTIDINE 20 MG: 20 TABLET ORAL at 09:11

## 2020-03-04 RX ADMIN — PROPOFOL 200 MG: 10 INJECTION, EMULSION INTRAVENOUS at 13:01

## 2020-03-04 RX ADMIN — LIDOCAINE HYDROCHLORIDE 4 ML: 10 INJECTION, SOLUTION INFILTRATION; PERINEURAL at 10:25

## 2020-03-04 RX ADMIN — LIDOCAINE HYDROCHLORIDE 100 MG: 20 INJECTION, SOLUTION EPIDURAL; INFILTRATION; INTRACAUDAL; PERINEURAL at 13:01

## 2020-03-04 NOTE — ANESTHESIA POSTPROCEDURE EVALUATION
Procedure(s):  RIGHT BREAST NEEDLE LOCALIZED LUMPECTOMY/ BIOPSY X2 PREOP 0830/ LOC 1000.     general    Anesthesia Post Evaluation      Multimodal analgesia: multimodal analgesia not used between 6 hours prior to anesthesia start to PACU discharge  Patient location during evaluation: PACU  Patient participation: complete - patient participated  Level of consciousness: awake and alert  Pain management: adequate  Airway patency: patent  Anesthetic complications: no  Cardiovascular status: hemodynamically stable  Respiratory status: acceptable  Hydration status: acceptable        Vitals Value Taken Time   /88 3/4/2020  1:54 PM   Temp 36.4 °C (97.5 °F) 3/4/2020  1:52 PM   Pulse 67 3/4/2020  1:54 PM   Resp 16 3/4/2020  1:54 PM   SpO2 100 % 3/4/2020  1:54 PM

## 2020-03-04 NOTE — ANESTHESIA PREPROCEDURE EVALUATION
Relevant Problems   No relevant active problems       Anesthetic History               Review of Systems / Medical History  Patient summary reviewed and pertinent labs reviewed    Pulmonary                   Neuro/Psych              Cardiovascular    Hypertension              Exercise tolerance: >4 METS     GI/Hepatic/Renal                Endo/Other      Hypothyroidism: well controlled       Other Findings   Comments: Right breast CA           Physical Exam    Airway  Mallampati: II  TM Distance: > 6 cm  Neck ROM: normal range of motion   Mouth opening: Normal     Cardiovascular  Regular rate and rhythm,  S1 and S2 normal,  no murmur, click, rub, or gallop  Rhythm: regular  Rate: normal         Dental  No notable dental hx       Pulmonary  Breath sounds clear to auscultation               Abdominal         Other Findings            Anesthetic Plan    ASA: 2  Anesthesia type: general            Anesthetic plan and risks discussed with: Patient      LMA

## 2020-03-04 NOTE — DISCHARGE INSTRUCTIONS
Patient Education        Open Breast Biopsy: What to Expect at Home  Your Recovery  An open breast biopsy is surgery to take a sample of breast tissue. A breast biopsy may be done to check a lump found during a breast exam. Or it may be done to check an area of concern found on a mammogram or ultrasound. The breast tissue will be sent to a lab, where a doctor will look at the tissue under a microscope to check for breast cancer. Your doctor may have some answers right away. But it can take up to 1 to 2 weeks to get the final results. Your doctor will discuss the results with you. For a few days after the surgery, you will probably feel tired and have some pain. The skin around the cut (incision) may feel firm, swollen, and tender. The area may be bruised. Tenderness usually goes away in a few days, and the bruising within 2 weeks. Firmness and swelling may take 3 to 6 months to go away. The stitches in your incision may dissolve on their own, or the doctor may take them out 7 to 10 days after surgery. This care sheet gives you a general idea about how long it will take for you to recover. But each person recovers at a different pace. Follow the steps below to get better as quickly as possible. How can you care for yourself at home? Activity    · Rest when you feel tired. Getting enough sleep will help you recover.     · Try to walk each day. Start by walking a little more than you did the day before. Bit by bit, increase the amount you walk. Walking boosts blood flow and helps prevent pneumonia and constipation.     · For 2 weeks, avoid strenuous activities that put pressure on your chest or that involve vigorous movement of your upper body and arm on the side of the biopsy. Examples of these might include strenuous housework, holding an active child, jogging, or aerobic exercise.     · For 2 weeks, avoid lifting anything that would make you strain.  This may include heavy grocery bags and milk containers, a heavy briefcase or backpack, cat litter or dog food bags, a vacuum , or a child.     · Ask your doctor when you can drive again.     · You will probably need to take 1 or 2 days off from work. This depends on the type of work you do and how you feel. Diet    · You can eat your normal diet. If your stomach is upset, try bland, low-fat foods like plain rice, broiled chicken, toast, and yogurt. Medicines    · Your doctor will tell you if and when you can restart your medicines. He or she will also give you instructions about taking any new medicines.     · If you take blood thinners, such as warfarin (Coumadin), clopidogrel (Plavix), or aspirin, be sure to talk to your doctor. He or she will tell you if and when to start taking those medicines again. Make sure that you understand exactly what your doctor wants you to do.     · Be safe with medicines. Take pain medicines exactly as directed. ? If the doctor gave you a prescription medicine for pain, take it as prescribed. ? If you are not taking a prescription pain medicine, ask your doctor if you can take an over-the-counter medicine.     · If you think your pain medicine is making you sick to your stomach:  ? Take your medicine after meals (unless your doctor has told you not to). ? Ask your doctor for a different pain medicine.     · If your doctor prescribed antibiotics, take them as directed. Do not stop taking them just because you feel better. You need to take the full course of antibiotics. Incision care    · If you have strips of tape on the incision, leave the tape on for a week or until it falls off.     · Wash the area daily with warm, soapy water, and pat it dry. Don't use hydrogen peroxide or alcohol, which can slow healing. You may cover the area with a gauze bandage if it weeps or rubs against clothing. Change the bandage every day.     · Keep the area clean and dry.    Other instructions    · For the first 3 days after surgery, wear a supportive bra all the time, even at night. Follow-up care is a key part of your treatment and safety. Be sure to make and go to all appointments, and call your doctor if you are having problems. It's also a good idea to know your test results and keep a list of the medicines you take. When should you call for help? Call 911 anytime you think you may need emergency care. For example, call if:    · You passed out (lost consciousness).     · You have chest pain, are short of breath, or cough up blood.    Call your doctor now or seek immediate medical care if:    · You are sick to your stomach or cannot drink fluids.     · You have pain that does not get better after you take pain medicine.     · You cannot pass stools or gas.     · You have signs of a blood clot in your leg (called a deep vein thrombosis), such as:  ? Pain in your calf, back of the knee, thigh, or groin. ? Redness or swelling in your leg.     · You have signs of infection, such as:  ? Increased pain, swelling, warmth, or redness. ? Red streaks leading from the incision. ? Pus draining from the incision. ? A fever.     · You have loose stitches, or your incision comes open.     · Bright red blood has soaked through the bandage over your incision.    Watch closely for changes in your health, and be sure to contact your doctor if:    · You have any problems.     · You have new or worse swelling or pain in your arm. Where can you learn more? Go to http://dona-gaetano.info/. Enter W736 in the search box to learn more about \"Open Breast Biopsy: What to Expect at Home. \"  Current as of: December 19, 2018  Content Version: 12.2  © 5879-0300 [employer]. Care instructions adapted under license by [employer] (which disclaims liability or warranty for this information).  If you have questions about a medical condition or this instruction, always ask your healthcare professional. Rogelio Craig disclaims any warranty or liability for your use of this information. Leave dressings until follow-up. Try to keep incisions as dry as possible to lower risk of infection. No driving until you are off pain meds for 24hrs and have no pain with movements associated with driving. Pain prescription (Norco) on chart for patient to use as needed for pain  Follow-up with Dr Lc Oliveira in 12 days on a Monday in the office at:  [address], [address]  (Call for an appt time unless one is already made for you by discharge nurse which is preferred->[phone]-->option 1)  After general anesthesia or intravenous sedation, for 24 hours or while taking prescription Narcotics:  · Limit your activities  · A responsible adult needs to be with you for the next 24 hours  · Do not drive and operate hazardous machinery  · Do not make important personal or business decisions  · Do not drink alcoholic beverages  · If you have not urinated within 8 hours after discharge, please contact your surgeon on call. · If you have sleep apnea and have a CPAP machine, please use it for all naps and sleeping. · Please use caution when taking narcotics and any of your home medications that may cause drowsiness. *  Please give a list of your current medications to your Primary Care Provider. *  Please update this list whenever your medications are discontinued, doses are      changed, or new medications (including over-the-counter products) are added. *  Please carry medication information at all times in case of emergency situations. These are general instructions for a healthy lifestyle:  No smoking/ No tobacco products/ Avoid exposure to second hand smoke  Surgeon General's Warning:  Quitting smoking now greatly reduces serious risk to your health.   Obesity, smoking, and sedentary lifestyle greatly increases your risk for illness  A healthy diet, regular physical exercise & weight monitoring are important for maintaining a healthy lifestyle    You may be retaining fluid if you have a history of heart failure or if you experience any of the following symptoms:  Weight gain of 3 pounds or more overnight or 5 pounds in a week, increased swelling in our hands or feet or shortness of breath while lying flat in bed. Please call your doctor as soon as you notice any of these symptoms; do not wait until your next office visit.

## 2020-03-05 NOTE — OP NOTES
[address] [address]  OPERATIVE REPORT    Name:  Renee Butt  MR#:  [id number]  :  1945  ACCOUNT #:  [de-identified]  DATE OF SERVICE:  2020    PREOPERATIVE DIAGNOSES:  1. Right breast ductal carcinoma in situ at 9 o'clock. 2.  Right breast papilloma at 8 o'clock. POSTOPERATIVE DIAGNOSES:  1. Right breast ductal carcinoma in situ at 9 o'clock. 2.  Right breast papilloma at 8 o'clock. PROCEDURES PERFORMED:  1. Right partial mastectomy for DCIS (CPT 27561). 2.  Right breast needle local lumpectomy at a separate site through a separate incision for a separate lesion (CPT B1558977). SURGEON:  Chandni Vee. Dallin Thomas MD    ASSISTANT:  None. ANESTHESIA:  General.    COMPLICATIONS:  None. SPECIMENS REMOVED:  Right lumpectomy and right partial mastectomy specimen sent to Pathology marked for orientation along with final shaved margins from the DCIS site. IMPLANTS:  None. ESTIMATED BLOOD LOSS:  Less than 30 mL. OPERATIVE PROCEDURE:  The patient was taken to the operating room, placed in the supine position. After adequate anesthesia was given, her right breast was prepped and draped in a sterile fashion with multiple layers of Betadine scrub and Betadine solution. Time-out protocol was followed. She was given prophylactic antibiotics. An oblique incision was made involving the 8 o'clock papilloma biopsy site. We dissected around the distal aspect of the guidewire and removed the lumpectomy specimen and marked for orientation with a short suture at the superior margin and a long suture at the lateral margin. It was imaged in two views and contained guidewire, biopsy clip, and radiographic target. It was approved by Radiology and sent to Pathology for review. The lumpectomy site was anesthetized and closed with interrupted deep dermal Vicryl sutures and skin clips. A sterile dressing was placed later in the case consisting of 4 x 4s and Tegaderms.     Attention was turned towards the ductal carcinoma in situ site at 9 o'clock. This was approached through a completely separate incision made obliquely in the right upper outer quadrant and lateral right breast.  We dissected a generous partial mastectomy specimen to include all the tissue around the distal aspect of the guidewire. We marked the specimen for orientation with a short suture at the superior margin and a long suture at the lateral margin. Our deep dissection went all the way to the pectoralis fascia. We dissected anteriorly to the skin. The specimen was removed and after marked for orientation, it was imaged in two views and contained the guidewire, biopsy clip, and radiographic target. It was approved by Radiology and sent to Pathology for review. The partial mastectomy site was then inspected. There was no evidence of gross or palpable disease remaining. Final shaved margins were obtained from the superior, inferior, medial, lateral, and deep margins. These were all sent separately marked to Pathology for review. The partial mastectomy site was then anesthetized with Marcaine. Irrigation was used. Hemostasis was confirmed. The incision was closed with interrupted deep dermal 3-0 Vicryl sutures and skin clips. A sterile dressing was placed consisting of 4 x 4s and Tegaderms. The patient was taken to Recovery in good condition. She tolerated the procedure well.       Parker Castaneda MD [id number]  D:  03/04/2020 13:49  T:  03/04/2020 20:00  JOB #:  [id number]

## 2020-08-07 ENCOUNTER — HOSPITAL ENCOUNTER (OUTPATIENT)
Dept: LAB | Age: 75
Discharge: HOME | End: 2020-08-07
Payer: MEDICARE

## 2020-08-07 DIAGNOSIS — D05.11 DUCTAL CARCINOMA IN SITU (DCIS) OF RIGHT BREAST: ICD-10-CM

## 2020-08-07 LAB
ALBUMIN SERPL-MCNC: 4.2 G/DL (ref 3.2–4.6)
ALBUMIN/GLOB SERPL: 1.3 {RATIO} (ref 1.2–3.5)
ALP SERPL-CCNC: 87 U/L (ref 50–136)
ALT SERPL-CCNC: 25 U/L (ref 12–65)
ANION GAP SERPL CALC-SCNC: 5 MMOL/L (ref 7–16)
AST SERPL-CCNC: 15 U/L (ref 15–37)
BASOPHILS # BLD: 0 K/UL (ref 0–0.2)
BASOPHILS NFR BLD: 0 % (ref 0–2)
BILIRUB SERPL-MCNC: 0.8 MG/DL (ref 0.2–1.1)
BUN SERPL-MCNC: 13 MG/DL (ref 8–23)
CALCIUM SERPL-MCNC: 9.4 MG/DL (ref 8.3–10.4)
CHLORIDE SERPL-SCNC: 104 MMOL/L (ref 98–107)
CO2 SERPL-SCNC: 28 MMOL/L (ref 21–32)
CREAT SERPL-MCNC: 0.9 MG/DL (ref 0.6–1)
DIFFERENTIAL METHOD BLD: ABNORMAL
EOSINOPHIL # BLD: 0.4 K/UL (ref 0–0.8)
EOSINOPHIL NFR BLD: 6 % (ref 0.5–7.8)
ERYTHROCYTE [DISTWIDTH] IN BLOOD BY AUTOMATED COUNT: 13.7 % (ref 11.9–14.6)
GLOBULIN SER CALC-MCNC: 3.3 G/DL (ref 2.3–3.5)
GLUCOSE SERPL-MCNC: 99 MG/DL (ref 65–100)
HCT VFR BLD AUTO: 46.8 % (ref 35.8–46.3)
HGB BLD-MCNC: 15.3 G/DL (ref 11.7–15.4)
IMM GRANULOCYTES # BLD AUTO: 0 K/UL (ref 0–0.5)
IMM GRANULOCYTES NFR BLD AUTO: 0 % (ref 0–5)
LYMPHOCYTES # BLD: 2.1 K/UL (ref 0.5–4.6)
LYMPHOCYTES NFR BLD: 33 % (ref 13–44)
MCH RBC QN AUTO: 29.5 PG (ref 26.1–32.9)
MCHC RBC AUTO-ENTMCNC: 32.7 G/DL (ref 31.4–35)
MCV RBC AUTO: 90.2 FL (ref 79.6–97.8)
MONOCYTES # BLD: 0.5 K/UL (ref 0.1–1.3)
MONOCYTES NFR BLD: 8 % (ref 4–12)
NEUTS SEG # BLD: 3.4 K/UL (ref 1.7–8.2)
NEUTS SEG NFR BLD: 53 % (ref 43–78)
NRBC # BLD: 0 K/UL (ref 0–0.2)
PLATELET # BLD AUTO: 268 K/UL (ref 150–450)
PMV BLD AUTO: 9.4 FL (ref 9.4–12.3)
POTASSIUM SERPL-SCNC: 4.3 MMOL/L (ref 3.5–5.1)
PROT SERPL-MCNC: 7.5 G/DL (ref 6.3–8.2)
RBC # BLD AUTO: 5.19 M/UL (ref 4.05–5.25)
SODIUM SERPL-SCNC: 137 MMOL/L (ref 136–145)
WBC # BLD AUTO: 6.5 K/UL (ref 4.3–11.1)

## 2020-08-07 PROCEDURE — 80053 COMPREHEN METABOLIC PANEL: CPT

## 2020-08-07 PROCEDURE — 85025 COMPLETE CBC W/AUTO DIFF WBC: CPT

## 2020-08-07 PROCEDURE — 36415 COLL VENOUS BLD VENIPUNCTURE: CPT

## 2020-11-05 PROBLEM — D24.1 INTRADUCTAL PAPILLOMA OF RIGHT BREAST: Status: RESOLVED | Noted: 2019-12-30 | Resolved: 2020-11-05

## 2020-11-24 ENCOUNTER — HOSPITAL ENCOUNTER (OUTPATIENT)
Dept: MAMMOGRAPHY | Age: 75
Discharge: HOME | End: 2020-11-24
Attending: SURGERY
Payer: MEDICARE

## 2020-11-24 ENCOUNTER — HOSPITAL ENCOUNTER (OUTPATIENT)
Dept: MAMMOGRAPHY | Age: 75
Discharge: HOME | End: 2020-11-24
Attending: INTERNAL MEDICINE
Payer: MEDICARE

## 2020-11-24 DIAGNOSIS — Z79.811 LONG TERM (CURRENT) USE OF AROMATASE INHIBITORS: ICD-10-CM

## 2020-11-24 DIAGNOSIS — D05.11 DUCTAL CARCINOMA IN SITU (DCIS) OF RIGHT BREAST: ICD-10-CM

## 2020-11-24 DIAGNOSIS — Z12.31 SCREENING MAMMOGRAM, ENCOUNTER FOR: ICD-10-CM

## 2020-11-24 PROCEDURE — 77080 DXA BONE DENSITY AXIAL: CPT

## 2020-11-24 PROCEDURE — 77063 BREAST TOMOSYNTHESIS BI: CPT

## 2021-07-01 ENCOUNTER — HOSPITAL ENCOUNTER (OUTPATIENT)
Dept: GENERAL RADIOLOGY | Age: 76
Discharge: HOME | End: 2021-07-01
Payer: MEDICARE

## 2021-07-01 DIAGNOSIS — M25.532 LEFT WRIST PAIN: ICD-10-CM

## 2021-07-01 DIAGNOSIS — M25.40 PAINFUL SWELLING OF JOINT: ICD-10-CM

## 2021-07-01 PROCEDURE — 73110 X-RAY EXAM OF WRIST: CPT

## 2021-07-01 NOTE — PROGRESS NOTES
Placed call to pt and LVM requesting call back for lab results. Left call back number and ext. Sent result to my chart, as well.

## 2021-07-01 NOTE — PROGRESS NOTES
Please let patient know her left wrist xray was negative for fracture. Will contact with lab results.

## 2021-08-13 ENCOUNTER — HOSPITAL ENCOUNTER (OUTPATIENT)
Dept: LAB | Age: 76
Discharge: HOME | End: 2021-08-13
Payer: MEDICARE

## 2021-08-13 DIAGNOSIS — D05.11 DUCTAL CARCINOMA IN SITU (DCIS) OF RIGHT BREAST: ICD-10-CM

## 2021-08-13 LAB
ALBUMIN SERPL-MCNC: 4.1 G/DL (ref 3.2–4.6)
ALBUMIN/GLOB SERPL: 1.1 {RATIO} (ref 1.2–3.5)
ALP SERPL-CCNC: 95 U/L (ref 50–136)
ALT SERPL-CCNC: 26 U/L (ref 12–65)
ANION GAP SERPL CALC-SCNC: 2 MMOL/L (ref 7–16)
AST SERPL-CCNC: 17 U/L (ref 15–37)
BASOPHILS # BLD: 0 K/UL (ref 0–0.2)
BASOPHILS NFR BLD: 0 % (ref 0–2)
BILIRUB SERPL-MCNC: 0.7 MG/DL (ref 0.2–1.1)
BUN SERPL-MCNC: 13 MG/DL (ref 8–23)
CALCIUM SERPL-MCNC: 9.6 MG/DL (ref 8.3–10.4)
CHLORIDE SERPL-SCNC: 106 MMOL/L (ref 98–107)
CO2 SERPL-SCNC: 31 MMOL/L (ref 21–32)
CREAT SERPL-MCNC: 1 MG/DL (ref 0.6–1)
DIFFERENTIAL METHOD BLD: ABNORMAL
EOSINOPHIL # BLD: 0.4 K/UL (ref 0–0.8)
EOSINOPHIL NFR BLD: 6 % (ref 0.5–7.8)
ERYTHROCYTE [DISTWIDTH] IN BLOOD BY AUTOMATED COUNT: 13.6 % (ref 11.9–14.6)
GLOBULIN SER CALC-MCNC: 3.7 G/DL (ref 2.3–3.5)
GLUCOSE SERPL-MCNC: 90 MG/DL (ref 65–100)
HCT VFR BLD AUTO: 46.9 % (ref 35.8–46.3)
HGB BLD-MCNC: 15.3 G/DL (ref 11.7–15.4)
IMM GRANULOCYTES # BLD AUTO: 0 K/UL (ref 0–0.5)
IMM GRANULOCYTES NFR BLD AUTO: 0 % (ref 0–5)
LYMPHOCYTES # BLD: 2.1 K/UL (ref 0.5–4.6)
LYMPHOCYTES NFR BLD: 32 % (ref 13–44)
MCH RBC QN AUTO: 29.5 PG (ref 26.1–32.9)
MCHC RBC AUTO-ENTMCNC: 32.6 G/DL (ref 31.4–35)
MCV RBC AUTO: 90.5 FL (ref 79.6–97.8)
MONOCYTES # BLD: 0.5 K/UL (ref 0.1–1.3)
MONOCYTES NFR BLD: 8 % (ref 4–12)
NEUTS SEG # BLD: 3.6 K/UL (ref 1.7–8.2)
NEUTS SEG NFR BLD: 54 % (ref 43–78)
NRBC # BLD: 0 K/UL (ref 0–0.2)
PLATELET # BLD AUTO: 265 K/UL (ref 150–450)
PMV BLD AUTO: 9.5 FL (ref 9.4–12.3)
POTASSIUM SERPL-SCNC: 3.8 MMOL/L (ref 3.5–5.1)
PROT SERPL-MCNC: 7.8 G/DL (ref 6.3–8.2)
RBC # BLD AUTO: 5.18 M/UL (ref 4.05–5.2)
SODIUM SERPL-SCNC: 139 MMOL/L (ref 136–145)
WBC # BLD AUTO: 6.6 K/UL (ref 4.3–11.1)

## 2021-08-13 PROCEDURE — 80053 COMPREHEN METABOLIC PANEL: CPT

## 2021-08-13 PROCEDURE — 36415 COLL VENOUS BLD VENIPUNCTURE: CPT

## 2021-08-13 PROCEDURE — 85025 COMPLETE CBC W/AUTO DIFF WBC: CPT

## 2021-11-18 ENCOUNTER — APPOINTMENT (OUTPATIENT)
Dept: CT IMAGING | Age: 76
End: 2021-11-18
Attending: EMERGENCY MEDICINE
Payer: MEDICARE

## 2021-11-18 ENCOUNTER — HOSPITAL ENCOUNTER (OUTPATIENT)
Age: 76
Setting detail: OBSERVATION
Discharge: HOME | End: 2021-11-19
Attending: EMERGENCY MEDICINE | Admitting: FAMILY MEDICINE
Payer: MEDICARE

## 2021-11-18 DIAGNOSIS — I10 ESSENTIAL HYPERTENSION WITH GOAL BLOOD PRESSURE LESS THAN 130/85: ICD-10-CM

## 2021-11-18 DIAGNOSIS — G45.9 TIA (TRANSIENT ISCHEMIC ATTACK): Primary | ICD-10-CM

## 2021-11-18 PROBLEM — R47.1 DYSARTHRIA: Status: ACTIVE | Noted: 2021-11-18

## 2021-11-18 LAB
ANION GAP SERPL CALC-SCNC: 5 MMOL/L (ref 7–16)
APTT PPP: 28.2 SEC (ref 24.1–35.1)
ATRIAL RATE: 70 BPM
BASOPHILS # BLD: 0 K/UL (ref 0–0.2)
BASOPHILS NFR BLD: 0 % (ref 0–2)
BUN SERPL-MCNC: 12 MG/DL (ref 8–23)
CALCIUM SERPL-MCNC: 9.1 MG/DL (ref 8.3–10.4)
CALCULATED P AXIS, ECG09: 45 DEGREES
CALCULATED R AXIS, ECG10: 74 DEGREES
CALCULATED T AXIS, ECG11: 89 DEGREES
CHLORIDE SERPL-SCNC: 105 MMOL/L (ref 98–107)
CO2 SERPL-SCNC: 28 MMOL/L (ref 21–32)
CREAT SERPL-MCNC: 0.85 MG/DL (ref 0.6–1)
DIAGNOSIS, 93000: NORMAL
DIFFERENTIAL METHOD BLD: NORMAL
EOSINOPHIL # BLD: 0.3 K/UL (ref 0–0.8)
EOSINOPHIL NFR BLD: 3 % (ref 0.5–7.8)
ERYTHROCYTE [DISTWIDTH] IN BLOOD BY AUTOMATED COUNT: 13.8 % (ref 11.9–14.6)
GLUCOSE BLD STRIP.AUTO-MCNC: 100 MG/DL (ref 65–100)
GLUCOSE SERPL-MCNC: 101 MG/DL (ref 65–100)
HCT VFR BLD AUTO: 44.9 % (ref 35.8–46.3)
HGB BLD-MCNC: 14.8 G/DL (ref 11.7–15.4)
IMM GRANULOCYTES # BLD AUTO: 0 K/UL (ref 0–0.5)
IMM GRANULOCYTES NFR BLD AUTO: 0 % (ref 0–5)
INR PPP: 0.9
LYMPHOCYTES # BLD: 2.9 K/UL (ref 0.5–4.6)
LYMPHOCYTES NFR BLD: 32 % (ref 13–44)
MCH RBC QN AUTO: 29.3 PG (ref 26.1–32.9)
MCHC RBC AUTO-ENTMCNC: 33 G/DL (ref 31.4–35)
MCV RBC AUTO: 88.9 FL (ref 79.6–97.8)
MONOCYTES # BLD: 0.6 K/UL (ref 0.1–1.3)
MONOCYTES NFR BLD: 7 % (ref 4–12)
NEUTS SEG # BLD: 5.2 K/UL (ref 1.7–8.2)
NEUTS SEG NFR BLD: 57 % (ref 43–78)
NRBC # BLD: 0 K/UL (ref 0–0.2)
P-R INTERVAL, ECG05: 150 MS
PLATELET # BLD AUTO: 267 K/UL (ref 150–450)
PMV BLD AUTO: 9.6 FL (ref 9.4–12.3)
POTASSIUM SERPL-SCNC: 3.8 MMOL/L (ref 3.5–5.1)
PROTHROMBIN TIME: 12.6 SEC (ref 12.6–14.5)
Q-T INTERVAL, ECG07: 430 MS
QRS DURATION, ECG06: 84 MS
QTC CALCULATION (BEZET), ECG08: 464 MS
RBC # BLD AUTO: 5.05 M/UL (ref 4.05–5.2)
SERVICE CMNT-IMP: NORMAL
SODIUM SERPL-SCNC: 138 MMOL/L (ref 136–145)
TROPONIN-HIGH SENSITIVITY: 14 PG/ML (ref 0–14)
VENTRICULAR RATE, ECG03: 70 BPM
WBC # BLD AUTO: 9 K/UL (ref 4.3–11.1)

## 2021-11-18 PROCEDURE — 85610 PROTHROMBIN TIME: CPT

## 2021-11-18 PROCEDURE — 74011000250 HC RX REV CODE- 250: Performed by: EMERGENCY MEDICINE

## 2021-11-18 PROCEDURE — 84484 ASSAY OF TROPONIN QUANT: CPT

## 2021-11-18 PROCEDURE — 82962 GLUCOSE BLOOD TEST: CPT

## 2021-11-18 PROCEDURE — 80048 BASIC METABOLIC PNL TOTAL CA: CPT

## 2021-11-18 PROCEDURE — 74011250637 HC RX REV CODE- 250/637: Performed by: EMERGENCY MEDICINE

## 2021-11-18 PROCEDURE — 74011250637 HC RX REV CODE- 250/637: Performed by: FAMILY MEDICINE

## 2021-11-18 PROCEDURE — 2709999900 HC NON-CHARGEABLE SUPPLY

## 2021-11-18 PROCEDURE — 74011250636 HC RX REV CODE- 250/636: Performed by: FAMILY MEDICINE

## 2021-11-18 PROCEDURE — 70450 CT HEAD/BRAIN W/O DYE: CPT

## 2021-11-18 PROCEDURE — 85730 THROMBOPLASTIN TIME PARTIAL: CPT

## 2021-11-18 PROCEDURE — G0378 HOSPITAL OBSERVATION PER HR: HCPCS

## 2021-11-18 PROCEDURE — 99285 EMERGENCY DEPT VISIT HI MDM: CPT

## 2021-11-18 PROCEDURE — 85025 COMPLETE CBC W/AUTO DIFF WBC: CPT

## 2021-11-18 PROCEDURE — 94762 N-INVAS EAR/PLS OXIMTRY CONT: CPT

## 2021-11-18 PROCEDURE — 93005 ELECTROCARDIOGRAM TRACING: CPT | Performed by: EMERGENCY MEDICINE

## 2021-11-18 PROCEDURE — 96375 TX/PRO/DX INJ NEW DRUG ADDON: CPT

## 2021-11-18 PROCEDURE — 96374 THER/PROPH/DIAG INJ IV PUSH: CPT

## 2021-11-18 RX ORDER — OXYBUTYNIN CHLORIDE 5 MG/1
5 TABLET ORAL 2 TIMES DAILY
Status: DISCONTINUED | OUTPATIENT
Start: 2021-11-18 | End: 2021-11-19 | Stop reason: HOSPADM

## 2021-11-18 RX ORDER — LABETALOL HYDROCHLORIDE 5 MG/ML
20 INJECTION, SOLUTION INTRAVENOUS ONCE
Status: COMPLETED | OUTPATIENT
Start: 2021-11-18 | End: 2021-11-18

## 2021-11-18 RX ORDER — LEVOTHYROXINE SODIUM 75 UG/1
75 TABLET ORAL
Status: DISCONTINUED | OUTPATIENT
Start: 2021-11-19 | End: 2021-11-19 | Stop reason: HOSPADM

## 2021-11-18 RX ORDER — ACETAMINOPHEN 325 MG/1
650 TABLET ORAL
Status: DISCONTINUED | OUTPATIENT
Start: 2021-11-18 | End: 2021-11-19 | Stop reason: HOSPADM

## 2021-11-18 RX ORDER — GUAIFENESIN 100 MG/5ML
324 LIQUID (ML) ORAL ONCE
Status: COMPLETED | OUTPATIENT
Start: 2021-11-18 | End: 2021-11-18

## 2021-11-18 RX ORDER — AMITRIPTYLINE HYDROCHLORIDE 50 MG/1
50 TABLET, FILM COATED ORAL
Status: DISCONTINUED | OUTPATIENT
Start: 2021-11-18 | End: 2021-11-19 | Stop reason: HOSPADM

## 2021-11-18 RX ORDER — HYDRALAZINE HYDROCHLORIDE 20 MG/ML
20 INJECTION INTRAMUSCULAR; INTRAVENOUS ONCE
Status: COMPLETED | OUTPATIENT
Start: 2021-11-18 | End: 2021-11-18

## 2021-11-18 RX ORDER — LISINOPRIL 20 MG/1
40 TABLET ORAL DAILY
Status: DISCONTINUED | OUTPATIENT
Start: 2021-11-19 | End: 2021-11-19 | Stop reason: HOSPADM

## 2021-11-18 RX ORDER — DICLOFENAC SODIUM 10 MG/G
2 GEL TOPICAL
Status: DISCONTINUED | OUTPATIENT
Start: 2021-11-19 | End: 2021-11-19 | Stop reason: HOSPADM

## 2021-11-18 RX ORDER — ATORVASTATIN CALCIUM 40 MG/1
40 TABLET, FILM COATED ORAL
Status: DISCONTINUED | OUTPATIENT
Start: 2021-11-18 | End: 2021-11-19 | Stop reason: HOSPADM

## 2021-11-18 RX ORDER — SODIUM CHLORIDE 0.9 % (FLUSH) 0.9 %
5-40 SYRINGE (ML) INJECTION AS NEEDED
Status: DISCONTINUED | OUTPATIENT
Start: 2021-11-18 | End: 2021-11-19 | Stop reason: HOSPADM

## 2021-11-18 RX ORDER — SODIUM CHLORIDE 0.9 % (FLUSH) 0.9 %
5-10 SYRINGE (ML) INJECTION EVERY 8 HOURS
Status: DISCONTINUED | OUTPATIENT
Start: 2021-11-18 | End: 2021-11-19 | Stop reason: HOSPADM

## 2021-11-18 RX ORDER — AMLODIPINE BESYLATE 5 MG/1
5 TABLET ORAL
Status: DISCONTINUED | OUTPATIENT
Start: 2021-11-19 | End: 2021-11-19 | Stop reason: HOSPADM

## 2021-11-18 RX ORDER — LABETALOL HYDROCHLORIDE 5 MG/ML
5 INJECTION, SOLUTION INTRAVENOUS
Status: DISCONTINUED | OUTPATIENT
Start: 2021-11-18 | End: 2021-11-19 | Stop reason: HOSPADM

## 2021-11-18 RX ORDER — SODIUM CHLORIDE 0.9 % (FLUSH) 0.9 %
5-10 SYRINGE (ML) INJECTION AS NEEDED
Status: DISCONTINUED | OUTPATIENT
Start: 2021-11-18 | End: 2021-11-19 | Stop reason: HOSPADM

## 2021-11-18 RX ORDER — SODIUM CHLORIDE 0.9 % (FLUSH) 0.9 %
5-40 SYRINGE (ML) INJECTION EVERY 8 HOURS
Status: DISCONTINUED | OUTPATIENT
Start: 2021-11-18 | End: 2021-11-19 | Stop reason: HOSPADM

## 2021-11-18 RX ORDER — ANASTROZOLE 1 MG/1
1 TABLET ORAL DAILY
Status: DISCONTINUED | OUTPATIENT
Start: 2021-11-19 | End: 2021-11-19 | Stop reason: HOSPADM

## 2021-11-18 RX ORDER — GUAIFENESIN 100 MG/5ML
81 LIQUID (ML) ORAL DAILY
Status: DISCONTINUED | OUTPATIENT
Start: 2021-11-19 | End: 2021-11-19 | Stop reason: HOSPADM

## 2021-11-18 RX ADMIN — Medication 10 ML: at 23:47

## 2021-11-18 RX ADMIN — HYDRALAZINE HYDROCHLORIDE 20 MG: 20 INJECTION INTRAMUSCULAR; INTRAVENOUS at 22:28

## 2021-11-18 RX ADMIN — AMITRIPTYLINE HYDROCHLORIDE 50 MG: 50 TABLET, FILM COATED ORAL at 22:28

## 2021-11-18 RX ADMIN — OXYBUTYNIN CHLORIDE 5 MG: 5 TABLET ORAL at 22:28

## 2021-11-18 RX ADMIN — ATORVASTATIN CALCIUM 40 MG: 40 TABLET, FILM COATED ORAL at 22:28

## 2021-11-18 RX ADMIN — LABETALOL HYDROCHLORIDE 20 MG: 5 INJECTION INTRAVENOUS at 19:25

## 2021-11-18 RX ADMIN — ASPIRIN 81 MG CHEWABLE TABLET 324 MG: 81 TABLET CHEWABLE at 19:22

## 2021-11-18 NOTE — ED TRIAGE NOTES
Pt to ED via POV with slurred speech that started 45 minutes ago. No other deficits. Slurred speech has resolved at this time. MD at bedside. Masked.

## 2021-11-19 ENCOUNTER — APPOINTMENT (OUTPATIENT)
Dept: MRI IMAGING | Age: 76
End: 2021-11-19
Attending: STUDENT IN AN ORGANIZED HEALTH CARE EDUCATION/TRAINING PROGRAM
Payer: MEDICARE

## 2021-11-19 ENCOUNTER — APPOINTMENT (OUTPATIENT)
Dept: NON INVASIVE DIAGNOSTICS | Age: 76
End: 2021-11-19
Attending: FAMILY MEDICINE
Payer: MEDICARE

## 2021-11-19 ENCOUNTER — APPOINTMENT (OUTPATIENT)
Dept: MRI IMAGING | Age: 76
End: 2021-11-19
Attending: FAMILY MEDICINE
Payer: MEDICARE

## 2021-11-19 VITALS
OXYGEN SATURATION: 95 % | RESPIRATION RATE: 19 BRPM | DIASTOLIC BLOOD PRESSURE: 72 MMHG | BODY MASS INDEX: 24.44 KG/M2 | TEMPERATURE: 98.2 F | SYSTOLIC BLOOD PRESSURE: 137 MMHG | HEART RATE: 74 BPM | WEIGHT: 165 LBS | HEIGHT: 69 IN

## 2021-11-19 LAB
ANION GAP SERPL CALC-SCNC: 5 MMOL/L (ref 7–16)
BUN SERPL-MCNC: 12 MG/DL (ref 8–23)
CALCIUM SERPL-MCNC: 9.6 MG/DL (ref 8.3–10.4)
CHLORIDE SERPL-SCNC: 107 MMOL/L (ref 98–107)
CHOLEST SERPL-MCNC: 161 MG/DL
CO2 SERPL-SCNC: 27 MMOL/L (ref 21–32)
CREAT SERPL-MCNC: 0.89 MG/DL (ref 0.6–1)
ERYTHROCYTE [DISTWIDTH] IN BLOOD BY AUTOMATED COUNT: 13.9 % (ref 11.9–14.6)
EST. AVERAGE GLUCOSE BLD GHB EST-MCNC: 114 MG/DL
GLUCOSE SERPL-MCNC: 111 MG/DL (ref 65–100)
HBA1C MFR BLD: 5.6 % (ref 4.2–6.3)
HCT VFR BLD AUTO: 42.6 % (ref 35.8–46.3)
HDLC SERPL-MCNC: 59 MG/DL (ref 40–60)
HDLC SERPL: 2.7 {RATIO}
HGB BLD-MCNC: 14.1 G/DL (ref 11.7–15.4)
LDLC SERPL CALC-MCNC: 73.8 MG/DL
MCH RBC QN AUTO: 29.2 PG (ref 26.1–32.9)
MCHC RBC AUTO-ENTMCNC: 33.1 G/DL (ref 31.4–35)
MCV RBC AUTO: 88.2 FL (ref 79.6–97.8)
NRBC # BLD: 0 K/UL (ref 0–0.2)
PLATELET # BLD AUTO: 234 K/UL (ref 150–450)
PMV BLD AUTO: 9.7 FL (ref 9.4–12.3)
POTASSIUM SERPL-SCNC: 3.5 MMOL/L (ref 3.5–5.1)
RBC # BLD AUTO: 4.83 M/UL (ref 4.05–5.2)
SODIUM SERPL-SCNC: 139 MMOL/L (ref 136–145)
TRIGL SERPL-MCNC: 141 MG/DL (ref 35–150)
TSH SERPL DL<=0.005 MIU/L-ACNC: 2.57 UIU/ML
VLDLC SERPL CALC-MCNC: 28.2 MG/DL (ref 6–23)
WBC # BLD AUTO: 8.2 K/UL (ref 4.3–11.1)

## 2021-11-19 PROCEDURE — 80048 BASIC METABOLIC PNL TOTAL CA: CPT

## 2021-11-19 PROCEDURE — 36415 COLL VENOUS BLD VENIPUNCTURE: CPT

## 2021-11-19 PROCEDURE — 70551 MRI BRAIN STEM W/O DYE: CPT

## 2021-11-19 PROCEDURE — 80061 LIPID PANEL: CPT

## 2021-11-19 PROCEDURE — 85027 COMPLETE CBC AUTOMATED: CPT

## 2021-11-19 PROCEDURE — 84443 ASSAY THYROID STIM HORMONE: CPT

## 2021-11-19 PROCEDURE — 97161 PT EVAL LOW COMPLEX 20 MIN: CPT

## 2021-11-19 PROCEDURE — G0378 HOSPITAL OBSERVATION PER HR: HCPCS

## 2021-11-19 PROCEDURE — 83036 HEMOGLOBIN GLYCOSYLATED A1C: CPT

## 2021-11-19 PROCEDURE — 97165 OT EVAL LOW COMPLEX 30 MIN: CPT

## 2021-11-19 PROCEDURE — 74011250637 HC RX REV CODE- 250/637: Performed by: FAMILY MEDICINE

## 2021-11-19 PROCEDURE — 70544 MR ANGIOGRAPHY HEAD W/O DYE: CPT

## 2021-11-19 RX ORDER — CLOPIDOGREL BISULFATE 75 MG/1
75 TABLET ORAL DAILY
Qty: 21 TABLET | Refills: 0 | Status: SHIPPED | OUTPATIENT
Start: 2021-11-19 | End: 2021-12-07 | Stop reason: SDUPTHER

## 2021-11-19 RX ORDER — ROSUVASTATIN CALCIUM 20 MG/1
20 TABLET, COATED ORAL
Qty: 30 TABLET | Refills: 1 | Status: SHIPPED | OUTPATIENT
Start: 2021-11-19 | End: 2021-12-07 | Stop reason: SDUPTHER

## 2021-11-19 RX ORDER — AMLODIPINE BESYLATE 5 MG/1
10 TABLET ORAL
Qty: 60 TABLET | Refills: 0 | Status: SHIPPED | OUTPATIENT
Start: 2021-11-19 | End: 2021-12-07 | Stop reason: SDUPTHER

## 2021-11-19 RX ADMIN — ANASTROZOLE 1 MG: 1 TABLET, COATED ORAL at 10:57

## 2021-11-19 RX ADMIN — LEVOTHYROXINE SODIUM 75 MCG: 0.07 TABLET ORAL at 06:28

## 2021-11-19 RX ADMIN — ASPIRIN 81 MG CHEWABLE TABLET 81 MG: 81 TABLET CHEWABLE at 08:48

## 2021-11-19 RX ADMIN — LISINOPRIL 40 MG: 20 TABLET ORAL at 08:49

## 2021-11-19 RX ADMIN — AMLODIPINE BESYLATE 5 MG: 5 TABLET ORAL at 08:45

## 2021-11-19 RX ADMIN — OXYBUTYNIN CHLORIDE 5 MG: 5 TABLET ORAL at 08:51

## 2021-11-19 NOTE — ED PROVIDER NOTES
61-year-old lady presents with concerns about difficulty enunciating and finding words that started approximately 30 minutes prior to arrival.  By the time she arrived in the ER her symptoms had resolved. She says she has no prior history of anything similar. She has no known history of strokes or TIAs. She does have a history of high blood pressure. With the symptoms she denied any chest pain, nausea, vomiting, or diarrhea. Elements of this note were created using speech recognition software. As such, errors of speech recognition may be present.            Past Medical History:   Diagnosis Date    Abnormal LFTs (liver function tests)     Breast cancer ([facility] [address] 03/04/2020    Right     Dyslipidemia     Hyperlipidemia     Hypertension     controlled with meds    Hypothyroidism     levothyroxine daily    Intraductal papilloma of right breast 12/30/2019    Urgency of urination     Varicose veins 9/17/12       Past Surgical History:   Procedure Laterality Date    HX BREAST BIOPSY Right 12/09/2019    2 areas    HX BREAST BIOPSY Right 3/4/2020    RIGHT BREAST NEEDLE LOCALIZED LUMPECTOMY/ BIOPSY X2 PREOP 0830/ LOC 1000 performed by Ev Lemos MD at [address] HX BREAST LUMPECTOMY Right 03/04/2020    HX COLONOSCOPY  12/10/2012 done    [facility]---7-10 yr recall    HX DILATION AND CURETTAGE      HX TONSIL AND ADENOIDECTOMY  as child    HX TUBAL LIGATION  1978    ND BREAST SURGERY PROCEDURE UNLISTED      cysts removed x 2 left breast- benign    US BREAST PREOP [employer] Right 03/04/2020    US BREAST PREOP LOCAL [employer] Right 03/04/2020    VASCULAR SURGERY PROCEDURE UNLIST      Varicose vein removal         Family History:   Problem Relation Age of Onset    Cancer Mother         breast and rectal cancer, passed at age 80    Stroke Mother     Diabetes Mother     Breast Cancer Mother 67    Heart Disease Father 50        mi- massive    Heart Attack Father     Cancer Sister breast    Breast Cancer Sister 62    Heart Disease Brother         mi    Hypertension Brother     Diabetes Brother        Social History     Socioeconomic History    Marital status:      Spouse name: Not on file    Number of children: Not on file    Years of education: Not on file    Highest education level: Not on file   Occupational History    Not on file   Tobacco Use    Smoking status: Former Smoker     Packs/day: 0.50     Years: 20.00     Pack years: 10.00     Quit date: 1990     Years since quittin.9    Smokeless tobacco: Never Used   Vaping Use    Vaping Use: Never used   Substance and Sexual Activity    Alcohol use: No     Comment: rarely    Drug use: No    Sexual activity: Yes     Partners: Female, Male   Other Topics Concern    Not on file   Social History Narrative    Not on file     Social Determinants of Health     Financial Resource Strain:     Difficulty of Paying Living Expenses: Not on file   Food Insecurity:     Worried About [address] in the Last Year: Not on file    [address] in the Last Year: Not on file   Transportation Needs:     Lack of Transportation (Medical): Not on file    Lack of Transportation (Non-Medical):  Not on file   Physical Activity:     Days of Exercise per Week: Not on file    Minutes of Exercise per Session: Not on file   Stress:     Feeling of Stress : Not on file   Social Connections:     Frequency of Communication with Friends and Family: Not on file    Frequency of Social Gatherings with Friends and Family: Not on file    Attends [employer] Services: Not on file    Active Member of Clubs or Organizations: Not on file    Attends Club or Organization Meetings: Not on file    Marital Status: Not on file   Intimate Partner Violence:     Fear of Current or Ex-Partner: Not on file    Emotionally Abused: Not on file    Physically Abused: Not on file    Sexually Abused: Not on file   Housing Stability:     Unable to Pay for Housing in the Last Year: Not on file    Number of Places Lived in the Last Year: Not on file    Unstable Housing in the Last Year: Not on file         ALLERGIES: Patient has no known allergies. Review of Systems   Constitutional: Negative for chills, diaphoresis and fever. HENT: Negative for congestion, rhinorrhea and sore throat. Eyes: Negative for redness and visual disturbance. Respiratory: Negative for cough, chest tightness, shortness of breath and wheezing. Cardiovascular: Negative for chest pain and palpitations. Gastrointestinal: Negative for abdominal pain, blood in stool, diarrhea, nausea and vomiting. Endocrine: Negative for polydipsia and polyuria. Genitourinary: Negative for dysuria and hematuria. Musculoskeletal: Negative for arthralgias, myalgias and neck stiffness. Skin: Negative for rash. Allergic/Immunologic: Negative for environmental allergies and food allergies. Neurological: Positive for speech difficulty. Negative for dizziness, weakness and headaches. Hematological: Negative for adenopathy. Does not bruise/bleed easily. Psychiatric/Behavioral: Negative for confusion and sleep disturbance. The patient is not nervous/anxious. Vitals:    11/18/21 1841 11/18/21 1842 11/18/21 1850   BP: (!) 203/92  (!) 164/115   Pulse:   73   Resp:   20   Temp:   97.8 °F (36.6 °C)   SpO2:   98%   Weight:  74.8 kg (165 lb)    Height:  5' 9\" (1.753 m)             Physical Exam  Vitals and nursing note reviewed. Constitutional:       General: She is not in acute distress. Appearance: She is well-developed. She is not toxic-appearing. HENT:      Head: Normocephalic and atraumatic. Eyes:      General: No scleral icterus. Right eye: No discharge. Left eye: No discharge. Conjunctiva/sclera: Conjunctivae normal.      Pupils: Pupils are equal, round, and reactive to light.    Cardiovascular:      Rate and Rhythm: Normal rate and regular rhythm. Heart sounds: Normal heart sounds. Pulmonary:      Effort: Pulmonary effort is normal. No respiratory distress. Breath sounds: Normal breath sounds. No wheezing or rales. Chest:      Chest wall: No tenderness. Abdominal:      General: Bowel sounds are normal. There is no distension. Palpations: Abdomen is soft. Tenderness: There is no guarding or rebound. Musculoskeletal:         General: No tenderness. Normal range of motion. Cervical back: Normal range of motion. No rigidity. Lymphadenopathy:      Cervical: No cervical adenopathy. Skin:     General: Skin is warm and dry. Neurological:      General: No focal deficit present. Mental Status: She is alert and oriented to person, place, and time. Psychiatric:         Mood and Affect: Mood normal.         Behavior: Behavior normal.          MDM  Number of Diagnoses or Management Options  TIA (transient ischemic attack)  Diagnosis management comments: CRITICAL CARE Documentation: This patient is critically ill and there is a high probability of of imminent or life threatening deterioration in the patient's condition without immediate management. The nature of the patient's clinical problem is: TIA    I have spent 30 minutes in direct patient care, documentation, review of labs/xrays/old records, discussion with patient, family, neurologist, hospitalist.     The time involved in the performance of separately reportable procedures was not counted toward critical care time.      Alvaro Perez MD; 11/18/2021 @7:28 PM      Orders Placed This Encounter      CT CODE NEURO HEAD WO CONTRAST      PROTHROMBIN TIME + INR      PTT      CBC WITH AUTOMATED DIFF      METABOLIC PANEL, BASIC      TROPONIN-HIGH SENSITIVITY      NURSING-MISCELLANEOUS: Activate CODE STROKE ONE TIME      POC GLUCOSE      NEURO CHECKS Q 2 hours      Obtain Weight      Perform NIH Stroke Scale      Nursing Dysphagia Screen      CARDIAC MONITOR - ED ONLY      PULSE OXIMETRY CONTINUOUS      POC PT/INR      GLUCOSE, POC      Initial ECG      SALINE LOCK IV ONE TIME Routine      sodium chloride (NS) flush 5-10 mL      sodium chloride (NS) flush 5-10 mL      aspirin chewable tablet 324 mg      labetaloL (NORMODYNE;TRANDATE) injection 20 mg         No diagnosis found. Results for orders placed or performed during the hospital encounter of 11/18/21  1.  PROTHROMBIN TIME + INR:        Result                      Value             Ref Range           Prothrombin time            12.6              12.6 - 14.5 *       INR                         0.9                              2. PTT:        Result                      Value             Ref Range           aPTT                        28.2              24.1 - 35.1 *  3. CBC WITH AUTOMATED DIFF:        Result                      Value             Ref Range           WBC                         9.0               4.3 - 11.1 K*       RBC                         5.05              4.05 - 5.2 M*       HGB                         14.8              11.7 - 15.4 *       HCT                         44.9              35.8 - 46.3 %       MCV                         88.9              79.6 - 97.8 *       MCH                         29.3              26.1 - 32.9 *       MCHC                        33.0              31.4 - 35.0 *       RDW                         13.8              11.9 - 14.6 %       PLATELET                    267               150 - 450 K/*       MPV                         9.6               9.4 - 12.3 FL       ABSOLUTE NRBC               0.00              0.0 - 0.2 K/*       DF                          AUTOMATED                             NEUTROPHILS                 57                43 - 78 %           LYMPHOCYTES                 32                13 - 44 %           MONOCYTES                   7                 4.0 - 12.0 %        EOSINOPHILS                 3                 0.5 - 7.8 %         BASOPHILS 0                 0.0 - 2.0 %         IMMATURE GRANULOCYTES       0                 0.0 - 5.0 %         ABS. NEUTROPHILS            5.2               1.7 - 8.2 K/*       ABS. LYMPHOCYTES            2.9               0.5 - 4.6 K/*       ABS. MONOCYTES              0.6               0.1 - 1.3 K/*       ABS. EOSINOPHILS            0.3               0.0 - 0.8 K/*       ABS. BASOPHILS              0.0               0.0 - 0.2 K/*       ABS. IMM. GRANS.            0.0               0.0 - 0.5 K/*  4. METABOLIC PANEL, BASIC:        Result                      Value             Ref Range           Sodium                      138               136 - 145 mm*       Potassium                   3.8               3.5 - 5.1 mm*       Chloride                    105               98 - 107 mmo*       CO2                         28                21 - 32 mmol*       Anion gap                   5 (L)             7 - 16 mmol/L       Glucose                     101 (H)           65 - 100 mg/*       BUN                         12                8 - 23 MG/DL        Creatinine                  0.85              0.6 - 1.0 MG*       GFR est AA                  >60               >60 ml/min/1*       GFR est non-AA              >60               >60 ml/min/1*       Calcium                     9.1               8.3 - 10.4 M*  5. TROPONIN-HIGH SENSITIVITY:        Result                      Value             Ref Range           Troponin-High Sensitiv*     14.0              0 - 14 pg/mL   6. GLUCOSE, POC:        Result                      Value             Ref Range           Glucose (POC)               100               65 - 100 mg/*       Performed by                                                  Christiano      8:42 PM  Patient's blood pressure is hovering right around 180/80. Thus, I will not yet start the Cardene drip.     ED Course as of 11/18/21 2042   Thu Nov 18, 2021   1836 Code stroke was called [AC]   1914 I spoke with the neurologist.  At the moment patient is doing well. We will give her some aspirin. Her pressure has gone up so we will recheck that and if it is still elevated will treat. [AC]   1921 Patient's pressure still elevated. I will give her 20 of IV labetalol. If that does not help lower her blood pressure will consider placing her on a Cardene drip.  [AC]      ED Course User Index  [AC] Nadine Loera MD       Procedures

## 2021-11-19 NOTE — PROGRESS NOTES
Care Management Interventions  PCP Verified by CM: Yes  Support Systems: Spouse/Significant Other  Confirm Follow Up Transport: Family  Discharge Location  Discharge Placement: Home    Saw pt in interdisciplinarily rounds with the following disciplines: Physician, Case Management, Nursing, Dietary,Therapy and Pharmacy. The plan of care was discussed along with goals for discharge date/ location. Per , pt is medically stable for d/c today, Per PT, pt may benefit from being discharged with NO further skilled therapy due to a proven ability to function at baseline.  Pt stated to  that she does not have any concerns and her  will be transporting her home from the hospital    MITCH Brown

## 2021-11-19 NOTE — ED NOTES
TRANSFER - OUT REPORT:    Verbal report given to vinnie(name) on Nicole James  being transferred to 328(unit) for routine progression of care       Report consisted of patients Situation, Background, Assessment and   Recommendations(SBAR). Information from the following report(s) SBAR, ED Summary and MAR was reviewed with the receiving nurse. Lines:   Peripheral IV 11/18/21 Left Antecubital (Active)   Site Assessment Clean, dry, & intact 11/18/21 1843   Phlebitis Assessment 0 11/18/21 1843   Infiltration Assessment 0 11/18/21 1843   Dressing Status Clean, dry, & intact 11/18/21 1843        Opportunity for questions and clarification was provided.       Patient transported with:

## 2021-11-19 NOTE — DISCHARGE SUMMARY
Hospitalist Discharge Summary   Admit Date:  2021  6:30 PM   DC Note date: 2021  Name:  Sue Salgado   Age:  78 y.o. Sex:  female  :  1945   MRN:  [id number]   Room:  [facility]  PCP:  Houston Lr MD    Presenting Complaint: Dysarthria    Initial Admission Diagnosis: Dysarthria [R47.1]     Problem List for this Hospitalization:  Hospital Problems as of 2021 Date Reviewed: 2021          Codes Class Noted - Resolved POA    * (Principal) Dysarthria ICD-10-CM: R47.1  ICD-9-CM: 784.51  2021 - Present Unknown        Ductal carcinoma in situ (DCIS) of right breast ICD-10-CM: D05.11  ICD-9-CM: 233.0  2019 - Present Yes    Overview Addendum 3/12/2020  6:47 AM by Jonas Lund MD     cTis, Right Breast DCIS      19 bilat screening mammo  Hx:   Remote benign left biopsy and a strong family history of breast cancer   give the patient a persistent lifetime risk of 20% by the Tyron Redhead model.     Heterogeneously dense fibroglandular tissue bilaterally. Superficial asymmetry posteriorly in the  right upper outer quadrant and a small nodular asymmetry centrally in the right  lower outer quadrant appear new from earlier studies and are incompletely  evaluated on these standard views. No other definite evidence for malignancy  is seen elsewhere in either breast.     IMPRESSION:       1.  RIGHT POSTERIOR UPPER OUTER QUADRANT SUPERFICIAL ASYMMETRY AND LOWER OUTER  QUADRANT CENTRAL NODULAR ASYMMETRY SHOULD BE FURTHER EVALUATED WITH STRAIGHT  LATERAL AND SPOT COMPRESSION VIEWS AS WELL AS POSSIBLE ULTRASOUND AT THIS TIME.     2.   HIGH-RISK SCREENING BREAST MRI SHOULD ALSO BE CONSIDERED AT THIS TIME IN  FOLLOWUP OF THE PRIOR STUDY IN  FOR THIS PATIENT WITH DENSE BREAST  PARENCHYMA AND A PERSISTENT 20% LIFETIME BREAST CANCER RISK (SEE ABOVE    19 right breast dx mammo and US  RIGHT MAMMOGRAM:  Straight lateral and spot compression views are compared with  2019 and earlier studies including MRI of December 22, 2014. They  confirm the presence of a small nodule at the 8:00 position 4 cm from the nipple  at mid-depth from the skin. There is also focal asymmetry superficially at the  posterior 9:00 position which persists with spot compression.     RIGHT BREAST ULTRASOUND:    6 mm microlobulated hypoechoic nodule at the 8:00 position 4 cm from the nipple. It is taller than wide, which is suspicious for malignancy. Biopsy is indicated. At the 9:00 position 6 cm from the nipple, there is an 8 mm lobular hypoechoic nodule   in the superficial fat associated with subtle acoustical shadowing. It appears new from prior mammogram in 2017, and the possibility of malignancy is suggested. Biopsy is also indicated.     IMPRESSION:    SUBCENTIMETER 8:00 AND SUPERFICIAL POSTERIOR 9:00 NODULES ARE SUSPICIOUS FOR  MALIGNANCY, AND BIOPSIES ARE RECOMMENDED.         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   ER: Positive (100%); TX: Positive (99%)   See full report in [address] [facility] as errors can occur when results are embedded into this report. Preliminary ultrasound evaluation of the right axilla demonstrated no pathologically enlarged or dysmorphic nodes. Post-bx mammo demonstrates the biopsy marker clips in expected positions. 12/19/19 Breast MRI  The breasts demonstrate moderate background glandularity and mild enhancement. Innumerable tiny scattered enhancing foci are noted on each side.     Right biopsy clip artifact is noted at 8:00 middle depth and 9:00 posterior depth.   The 8:00 slight demonstrates only minimal enhancement surrounding the biopsy site. At 9:00 there is 1.2 cm of clumped nonmass enhancement consistent with DCIS.     There is no other evidence of suspicious enhancing mass or dominant nonmass  enhancement to suggest malignancy elsewhere in either breast.   No evidence of axillary or internal mammary lymphadenopathy.     Elsewhere, limited visualization of the partially included thorax and upper  abdomen shows no concerning findings.      IMPRESSION:   1. Right 9:00 DCIS site demonstrates 1.2 cm of enhancement surrounding the clip. 2. Right 8:00 atypical papilloma site demonstrates only minimal residual enhancement. 3. No suspicious finding otherwise in either breast. No lymphadenopathy.     This patient would benefit from yearly supplemental MRI in the future, as long as she is able,  given increased risk and dense breast parenchyma. 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 B: RIGHT PARTIAL MASTECTOMY FOR DCIS: RESIDUAL INTERMEDIATE GRADE DUCTAL CARCINOMA IN SITU, CRIBRIFORM TYPE WITH COMEDO NECROSIS, 7 MM IN GREATEST DIMENSION, MARGINS UNINVOLVED; FIBROCYSTIC MASTOPATHY. C: FINAL SUPERIOR MARGIN RIGHT BREAST: BENIGN FIBROADIPOSE BREAST TISSUE. D: FINAL INFERIOR MARGIN RIGHT BREAST: BENIGN FIBROADIPOSE BREAST TISSUE. E: FINAL MEDIAL MARGIN RIGHT BREAST: BENIGN FIBROADIPOSE BREAST TISSUE. F: FINAL LATERAL MARGIN RIGHT BREAST: BENIGN FIBROADIPOSE BREAST TISSUE.    G: FINAL DEEP MARGIN RIGHT BREAST: BENIGN FIBROADIPOSE TISSUE AND SKELETAL MUSCLE. Electronically signed out on 3/10/2020 08:44 by Anneliese Smith. Ally Gramajo MD     3/12/20 presented at [facility]; plan refer to medical and radiation oncology                   Essential hypertension with goal blood pressure less than 130/85 ICD-10-CM: I10  ICD-9-CM: 401.9  4/17/2018 - Present         Hypertension ICD-10-CM: I10  ICD-9-CM: 401.9  Unknown - Present Yes    Overview Signed 7/12/2013  1:23 PM by Johnny Vu RN     controlled with meds             Hypothyroidism ICD-10-CM: E03.9  ICD-9-CM: 244.9  Unknown - Present Yes    Overview Signed 7/12/2013  1:23 PM by Johnny Vu RN     levothyroxine daily             Dyslipidemia ICD-10-CM: E78.5  ICD-9-CM: 272.4  Unknown - Present Yes            Did Patient have Sepsis (YES OR NO): NO    Hospital Course:  HPI: David Mcknight is a 78 y.o. female with medical history of HTN, HLD, breast cancer who presented to ED with dysarthria. Patient reports that about 45 minutes prior to arrival to ER, she had some slurred speech and word-finding difficulties. By the time of arrival to ER, her symptoms have completely resolved. CODE S was called upon arrival, and Tele-Neurology was consulted. Patient is not a candidate for tPA given complete resolution of symptoms. Hospitalist to admit for TIA workup.     Denies fevers, chills, headache, SOB, nausea, vomiting, dizziness, weakness, sensation loss. \"    Patient was admitted for stroke vs TIA work-up per tele neurology recommendations. She had CT head on admission which showed chronic appearing white matter changes without any acute intracranial abnormality. She had MRI brain which showed no acute or early subacute infarction. She had MRA of the head which did not show any vascular abnormality. She was started on high intensity statin in place of her previous simvastatin and aspirin/Plavix for TIA treatment and further prevention.   He did have elevated blood pressures on admission which was treated in the ER.  Her blood pressure medications were continued on discharge as well as increasing her home Norvasc for further management of her uncontrolled hypertension. She was not able to have an echocardiogram performed inpatient and after discussion with patient she preferred not to wait another night in the hospital for echo in the morning. I advised her to discuss with her PCP the need for an order for echocardiogram with bubble. She expressed understanding and stated that she had upcoming appointment with her PCP and would ask him at that time. She also complained of palpitations at one point during hospitalization, but unfortunately despite her wearing telemetry monitor this was not related to the telemetry unit. EKG on admission did not show any atrial fibrillation, but I informed patient that if she were continue to have palpitations at any point in the outpatient setting she should discuss with her PCP about getting set up for a cardiac monitor. Patient being discharged with high intensity statin, aspirin/Plavix for 21 days with recommendations to continue taking only baby aspirin daily thereafter. Advised her to follow-up closely with her PCP for further evaluation and management of her acute and chronic medical conditions. Disposition: Home or Self Care  Diet: ADULT DIET Regular  Code Status: Full Code    Follow Up Orders: Follow-up Appointments   Procedures    FOLLOW UP VISIT Appointment in: One Week     Standing Status:   Standing     Number of Occurrences:   1     Order Specific Question:   Appointment in     Answer:    One Week       Follow-up Information     Follow up With Specialties Details Why Tameka Lamar MD Internal Medicine In 1 week  [address]  [address] [id number] [phone]            Follow up labs/diagnostics (ultimately defer to outpatient provider):  Blood pressure evaluation and management  ECHO with Bubble order    Time spent in patient discharge and coordination 38 minutes. Plan was discussed with patient and . All questions answered. Patient was stable at time of discharge. Instructions given to call a physician or return if any concerns. Discharge Info:   Current Discharge Medication List      START taking these medications    Details   clopidogreL (Plavix) 75 mg tab Take 1 Tablet by mouth daily for 21 days. Qty: 21 Tablet, Refills: 0  Start date: 11/19/2021, End date: 12/10/2021      rosuvastatin (CRESTOR) 20 mg tablet Take 1 Tablet by mouth nightly for 60 days. Qty: 30 Tablet, Refills: 1  Start date: 11/19/2021, End date: 1/18/2022         CONTINUE these medications which have CHANGED    Details   amLODIPine (NORVASC) 5 mg tablet Take 2 Tablets by mouth daily (with breakfast) for 30 days. Qty: 60 Tablet, Refills: 0  Start date: 11/19/2021, End date: 12/19/2021    Associated Diagnoses: Essential hypertension with goal blood pressure less than 130/85         CONTINUE these medications which have NOT CHANGED    Details   diclofenac (VOLTAREN) 1 % gel Apply 2 g to affected area three (3) times daily as needed for Pain. Left wrist  Qty: 1 Each, Refills: 0    Associated Diagnoses: Left wrist tendonitis      anastrozole (ARIMIDEX) 1 mg tablet TAKE 1 TABLET BY MOUTH EVERY DAY  Qty: 90 Tab, Refills: 3    Associated Diagnoses: Ductal carcinoma in situ (DCIS) of right breast      ramipriL (ALTACE) 10 mg capsule Take 1 Cap by mouth daily. Qty: 90 Cap, Refills: 3    Associated Diagnoses: Essential hypertension with goal blood pressure less than 130/85      oxybutynin (DITROPAN) 5 mg tablet Take 1 Tab by mouth two (2) times a day. Qty: 180 Tab, Refills: 3    Associated Diagnoses: OAB (overactive bladder)      amitriptyline (ELAVIL) 50 mg tablet Take 1 Tab by mouth nightly. Qty: 90 Tab, Refills: 3    Associated Diagnoses: Chronic insomnia      levothyroxine (SYNTHROID) 75 mcg tablet Take 1 Tab by mouth Daily (before breakfast).   Qty: 90 Tab, Refills: 3    Associated Diagnoses: Acquired hypothyroidism      ibuprofen (MOTRIN) 600 mg tablet Take 1 Tab by mouth every six (6) hours as needed for Pain. Indications: Pain  Qty: 60 Tab, Refills: 0    Associated Diagnoses: Midline low back pain without sciatica, unspecified chronicity      multivitamin (ONE A DAY) tablet Take 1 Tab by mouth nightly. calcium 500 mg tab Take 1,000 mg by mouth nightly. aspirin 81 mg CpDR Take 1 Each by mouth nightly. omega-3 fatty acids-vitamin e (FISH OIL) 1,000 mg Cap Take 1 Cap by mouth nightly. Indications: held for surgery- last dose 9/17/12         STOP taking these medications       simvastatin (ZOCOR) 20 mg tablet Comments:   Reason for Stopping:               Procedures done this admission:  * No surgery found *    Consults this admission:  None    Echocardiogram/EKG results:  No results found for this or any previous visit. EKG Results     Procedure 720 Value Units Date/Time    Initial ECG [[id number]] Collected: 11/18/21 1913    Order Status: Completed Updated: 11/18/21 2154     Ventricular Rate 70 BPM      Atrial Rate 70 BPM      P-R Interval 150 ms      QRS Duration 84 ms      Q-T Interval 430 ms      QTC Calculation (Bezet) 464 ms      Calculated P Axis 45 degrees      Calculated R Axis 74 degrees      Calculated T Axis 89 degrees      Diagnosis --       Normal sinus rhythm  diminished anterior forces. Abnormal ECG  When compared with ECG of 18-NOV-2021 13:42,  Sinus rhythm is no longer with 2nd degree A-V block  Right bundle branch block is no longer Present  Confirmed by Remigio Gandhi ([id number]) on 11/18/2021 9:54:06 PM            Diagnostic Imaging/Tests:   MRA BRAIN WO CONT    Result Date: 11/19/2021  Head MRA History: TIA. Slurred speech. Were difficulties. Sequences: Contiguous axial 3D time-of-flight images of the head were used to generate maximum intensity projection images of the [place] and vertebrobasilar systems. Comparison: None Findings: The signal within the petrous, cavernous and supraclinoid internal carotid arteries is equal and symmetric. Normal signal is noted within the anterior cerebral arteries and middle cerebral arteries bilaterally. No evidence of aneurysm or AVM. Imaging of the posterior fossa vasculature demonstrates normal signal within the vertebral arteries, basilar artery and posterior cerebral arteries. 1. Negative Head MRA    MRI BRAIN WO CONT    Result Date: 11/19/2021  EXAMINATION: BRAIN MRI 11/19/2021 10:22 AM ACCESSION NUMBER: [id number] INDICATION: Transient ischemic attack. Slurred speech with word finding difficulties. COMPARISON: CT head 11/18/2021. TECHNIQUE: Multiplanar multisequence MRI of the brain without the administration of intravenous contrast. FINDINGS: There is a mild degree of scattered and confluent foci of T2/FLAIR hyperintensity within the periventricular and deep white matter, nonspecific but most commonly seen with chronic small vessel ischemic changes. Tiny punctate chronic microhemorrhage in the right centrum semiovale. No midline shift or mass lesion. There is no evidence of intracranial hemorrhage or acute infarct. The ventricles are within normal limits in size and configuration. There are no extra-axial fluid collections present. No diffusion weighted signal abnormality is identified. Bilateral lens replacement. No acute or subacute appearing intracranial abnormality. CT CODE NEURO HEAD WO CONTRAST    Result Date: 11/18/2021  History: cva, slurred speech, onset 45 minutes ago. Exam: CT head without contrast Technique: Thin section axial CT images were obtained from the skullbase through the vertex. Radiation dose reduction techniques were used for this study. Our CT scanners use one or all of the following: Automated exposure control, adjustment of the mA and/or kV according to patient size, use of iterative reconstruction.  Findings: The ventricles are normal in size, shape, and position. Chronic appearing white matter change noted in the corona radiata and centrum semiovale. No extra-axial fluid collection is present. There is no mass or mass-effect. The basilar cisterns are patent. The paranasal sinuses and mastoid air cells are clear. Chronic appearing white matter change without acute intracranial abnormality. All Micro Results     None          Labs: Results:       BMP, Mg, Phos Recent Labs     11/19/21 0438 11/18/21  1835    138   K 3.5 3.8    105   CO2 27 28   AGAP 5* 5*   BUN 12 12   CREA 0.89 0.85   CA 9.6 9.1   * 101*      CBC Recent Labs     11/19/21 0438 11/18/21  1835   WBC 8.2 9.0   RBC 4.83 5.05   HGB 14.1 14.8   HCT 42.6 44.9    267   GRANS  --  57   LYMPH  --  32   EOS  --  3   MONOS  --  7   BASOS  --  0   IG  --  0   ANEU  --  5.2   ABL  --  2.9   QUITA  --  0.3   ABM  --  0.6   ABB  --  0.0   AIG  --  0.0      LFT No results for input(s): ALT, TBIL, AP, TP, ALB, GLOB, AGRAT in the last 72 hours.     No lab exists for component: SGOT, GPT   Cardiac Testing No results found for: BNPP, BNP, CPK, RCK1, RCK2, RCK3, RCK4, CKMB, CKNDX, CKND1, TROPT, TROIQ   Coagulation Tests Lab Results   Component Value Date/Time    Prothrombin time 12.6 11/18/2021 06:35 PM    INR 0.9 11/18/2021 06:35 PM    aPTT 28.2 11/18/2021 06:35 PM      A1c Lab Results   Component Value Date/Time    Hemoglobin A1c 5.6 11/19/2021 04:38 AM      Lipid Panel Lab Results   Component Value Date/Time    Cholesterol, total 161 11/19/2021 04:38 AM    HDL Cholesterol 59 11/19/2021 04:38 AM    LDL, calculated 73.8 11/19/2021 04:38 AM    VLDL, calculated 28.2 (H) 11/19/2021 04:38 AM    Triglyceride 141 11/19/2021 04:38 AM    CHOL/HDL Ratio 2.7 11/19/2021 04:38 AM      Thyroid Panel Lab Results   Component Value Date/Time    TSH 2.570 11/19/2021 04:38 AM    TSH 5.450 (H) 11/12/2021 08:37 AM        Most Recent UA Lab Results   Component Value Date/Time    Color Yellow 10/30/2020 09:03 AM    Appearance Clear 10/30/2020 09:03 AM    pH (UA) 5.5 10/30/2020 09:03 AM    Ketone Negative 10/30/2020 09:03 AM    Bilirubin Negative 10/30/2020 09:03 AM    Blood Trace (A) 10/30/2020 09:03 AM    Nitrites Negative 10/30/2020 09:03 AM    Leukocyte Esterase 1+ (A) 10/30/2020 09:03 AM    WBC 6-10 (A) 10/30/2020 09:03 AM    RBC 0-2 10/30/2020 09:03 AM    Epithelial cells 0-10 10/30/2020 09:03 AM    Bacteria None seen 10/30/2020 09:03 AM    Mucus Present 10/30/2020 09:03 AM          All Labs from Last 24 Hrs:  Recent Results (from the past 24 hour(s))   PROTHROMBIN TIME + INR    Collection Time: 11/18/21  6:35 PM   Result Value Ref Range    Prothrombin time 12.6 12.6 - 14.5 sec    INR 0.9     PTT    Collection Time: 11/18/21  6:35 PM   Result Value Ref Range    aPTT 28.2 24.1 - 35.1 SEC   CBC WITH AUTOMATED DIFF    Collection Time: 11/18/21  6:35 PM   Result Value Ref Range    WBC 9.0 4.3 - 11.1 K/uL    RBC 5.05 4.05 - 5.2 M/uL    HGB 14.8 11.7 - 15.4 g/dL    HCT 44.9 35.8 - 46.3 %    MCV 88.9 79.6 - 97.8 FL    MCH 29.3 26.1 - 32.9 PG    MCHC 33.0 31.4 - 35.0 g/dL    RDW 13.8 11.9 - 14.6 %    PLATELET 945 815 - 103 K/uL    MPV 9.6 9.4 - 12.3 FL    ABSOLUTE NRBC 0.00 0.0 - 0.2 K/uL    DF AUTOMATED      NEUTROPHILS 57 43 - 78 %    LYMPHOCYTES 32 13 - 44 %    MONOCYTES 7 4.0 - 12.0 %    EOSINOPHILS 3 0.5 - 7.8 %    BASOPHILS 0 0.0 - 2.0 %    IMMATURE GRANULOCYTES 0 0.0 - 5.0 %    ABS. NEUTROPHILS 5.2 1.7 - 8.2 K/UL    ABS. LYMPHOCYTES 2.9 0.5 - 4.6 K/UL    ABS. MONOCYTES 0.6 0.1 - 1.3 K/UL    ABS. EOSINOPHILS 0.3 0.0 - 0.8 K/UL    ABS. BASOPHILS 0.0 0.0 - 0.2 K/UL    ABS. IMM.  GRANS. 0.0 0.0 - 0.5 K/UL   METABOLIC PANEL, BASIC    Collection Time: 11/18/21  6:35 PM   Result Value Ref Range    Sodium 138 136 - 145 mmol/L    Potassium 3.8 3.5 - 5.1 mmol/L    Chloride 105 98 - 107 mmol/L    CO2 28 21 - 32 mmol/L    Anion gap 5 (L) 7 - 16 mmol/L    Glucose 101 (H) 65 - 100 mg/dL    BUN 12 8 - 23 MG/DL    Creatinine 0.85 0.6 - 1.0 MG/DL    GFR est AA >60 >60 ml/min/1.73m2    GFR est non-AA >60 >60 ml/min/1.73m2    Calcium 9.1 8.3 - 10.4 MG/DL   TROPONIN-HIGH SENSITIVITY    Collection Time: 11/18/21  6:35 PM   Result Value Ref Range    Troponin-High Sensitivity 14.0 0 - 14 pg/mL   GLUCOSE, POC    Collection Time: 11/18/21  6:35 PM   Result Value Ref Range    Glucose (POC) 100 65 - 100 mg/dL    Performed by Christiano    EKG, 12 LEAD, INITIAL    Collection Time: 11/18/21  7:13 PM   Result Value Ref Range    Ventricular Rate 70 BPM    Atrial Rate 70 BPM    P-R Interval 150 ms    QRS Duration 84 ms    Q-T Interval 430 ms    QTC Calculation (Bezet) 464 ms    Calculated P Axis 45 degrees    Calculated R Axis 74 degrees    Calculated T Axis 89 degrees    Diagnosis         Normal sinus rhythm  diminished anterior forces.    Abnormal ECG  When compared with ECG of 18-NOV-2021 13:42,  Sinus rhythm is no longer with 2nd degree A-V block  Right bundle branch block is no longer Present  Confirmed by Donn Henderson ([id number]) on 11/18/2021 9:54:06 PM     LIPID PANEL    Collection Time: 11/19/21  4:38 AM   Result Value Ref Range    Cholesterol, total 161 MG/DL    Triglyceride 141 35 - 150 MG/DL    HDL Cholesterol 59 40 - 60 MG/DL    LDL, calculated 73.8 <100 MG/DL    VLDL, calculated 28.2 (H) 6.0 - 23.0 MG/DL    CHOL/HDL Ratio 2.7 <200     HEMOGLOBIN A1C WITH EAG    Collection Time: 11/19/21  4:38 AM   Result Value Ref Range    Hemoglobin A1c 5.6 4.20 - 6.30 %    Est. average glucose 114 mg/dL   CBC W/O DIFF    Collection Time: 11/19/21  4:38 AM   Result Value Ref Range    WBC 8.2 4.3 - 11.1 K/uL    RBC 4.83 4.05 - 5.2 M/uL    HGB 14.1 11.7 - 15.4 g/dL    HCT 42.6 35.8 - 46.3 %    MCV 88.2 79.6 - 97.8 FL    MCH 29.2 26.1 - 32.9 PG    MCHC 33.1 31.4 - 35.0 g/dL    RDW 13.9 11.9 - 14.6 %    PLATELET 216 284 - 867 K/uL    MPV 9.7 9.4 - 12.3 FL    ABSOLUTE NRBC 0.00 0.0 - 0.2 K/uL   TSH 3RD GENERATION    Collection Time: 11/19/21  4:38 AM   Result Value Ref Range    TSH 2.338 uIU/mL   METABOLIC PANEL, BASIC    Collection Time: 11/19/21  4:38 AM   Result Value Ref Range    Sodium 139 136 - 145 mmol/L    Potassium 3.5 3.5 - 5.1 mmol/L    Chloride 107 98 - 107 mmol/L    CO2 27 21 - 32 mmol/L    Anion gap 5 (L) 7 - 16 mmol/L    Glucose 111 (H) 65 - 100 mg/dL    BUN 12 8 - 23 MG/DL    Creatinine 0.89 0.6 - 1.0 MG/DL    GFR est AA >60 >60 ml/min/1.73m2    GFR est non-AA >60 >60 ml/min/1.73m2    Calcium 9.6 8.3 - 10.4 MG/DL       Current Med List in Hospital:   Current Facility-Administered Medications   Medication Dose Route Frequency    sodium chloride (NS) flush 5-10 mL  5-10 mL IntraVENous Q8H    sodium chloride (NS) flush 5-10 mL  5-10 mL IntraVENous PRN    aspirin chewable tablet 81 mg  81 mg Oral DAILY    lisinopriL (PRINIVIL, ZESTRIL) tablet 40 mg  40 mg Oral DAILY    atorvastatin (LIPITOR) tablet 40 mg  40 mg Oral QHS    oxybutynin (DITROPAN) tablet 5 mg  5 mg Oral BID    amLODIPine (NORVASC) tablet 5 mg  5 mg Oral DAILY WITH BREAKFAST    amitriptyline (ELAVIL) tablet 50 mg  50 mg Oral QHS    levothyroxine (SYNTHROID) tablet 75 mcg  75 mcg Oral ACB    anastrozole (ARIMIDEX) tablet 1 mg  1 mg Oral DAILY    diclofenac (VOLTAREN) 1 % topical gel 2 g  2 g Topical TID PRN    sodium chloride (NS) flush 5-40 mL  5-40 mL IntraVENous Q8H    sodium chloride (NS) flush 5-40 mL  5-40 mL IntraVENous PRN    acetaminophen (TYLENOL) tablet 650 mg  650 mg Oral Q4H PRN    labetaloL (NORMODYNE;TRANDATE) injection 5 mg  5 mg IntraVENous Q10MIN PRN       No Known Allergies  Immunization History   Administered Date(s) Administered    COVID-19, PFIZER, MRNA, LNP-S, PF, 30MCG/0.3ML DOSE 02/11/2021, 03/04/2021    Influenza High Dose Vaccine PF 10/08/2015, 09/19/2016, 10/19/2017, 09/06/2018, 09/30/2019, 09/01/2020    Influenza Vaccine 01/01/2012, 09/12/2013    Influenza Vaccine (Tri) Adjuvanted (>65 Yrs FLUAD TRI [id number]) 09/06/2018, 08/29/2019    Influenza, Quadrivalent, Adjuvanted (>65 Yrs FLUAD QUAD [id number]) 09/04/2020    Pneumococcal Conjugate (PCV-13) 04/12/2017    Pneumococcal Polysaccharide (PPSV-23) 04/17/2018    Pneumococcal Vaccine (Unspecified Type) 01/01/2012    Td 01/01/2012    Tdap 01/01/2012, 11/05/2020    Zoster Recombinant 08/28/2019, 01/30/2020       Recent Vital Data:  Patient Vitals for the past 24 hrs:   Temp Pulse Resp BP SpO2   11/19/21 1109 98.2 °F (36.8 °C) 74 19 137/72 95 %   11/19/21 0658 97.6 °F (36.4 °C) 67 18 (!) 153/83 96 %   11/19/21 0336 99.4 °F (37.4 °C) 72 18 102/65 96 %   11/18/21 2139 97.7 °F (36.5 °C) 67 16 (!) 190/97 94 %   11/18/21 2045  63 18 (!) 211/95    11/18/21 2030  65 24 (!) 179/84    11/18/21 2000  (!) 59 14 (!) 174/82    11/18/21 1930  62 13 (!) 141/70    11/18/21 1925  67  (!) 179/92    11/18/21 1900  78 22 (!) 227/107 96 %   11/18/21 1850 97.8 °F (36.6 °C) 73 20 (!) 164/115 98 %   11/18/21 1841    (!) 203/92      Oxygen Therapy  O2 Sat (%): 95 % (11/19/21 1109)  Pulse via Oximetry: 74 beats per minute (11/18/21 1900)  O2 Device: None (Room air) (11/19/21 0658)    Estimated body mass index is 24.37 kg/m² as calculated from the following:    Height as of this encounter: 5' 9\" (1.753 m). Weight as of this encounter: 74.8 kg (165 lb). No intake or output data in the 24 hours ending 11/19/21 1254      Physical Exam:    General:    Well nourished. No overt distress  Head:  Normocephalic, atraumatic  Eyes:  Sclerae appear normal.  Pupils equally round. HENT:  Nares appear normal, no drainage. Moist mucous membranes  Neck:  No restricted ROM. Trachea midline  CV:   RRR. No m/r/g. No JVD  Lungs:   CTAB. No wheezing, rhonchi, or rales. Even, unlabored  Abdomen:   Soft, nontender, nondistended. Extremities: Warm and dry. No cyanosis or clubbing. No edema. Skin:     No rashes. Normal coloration  Neuro:  CN II-XII grossly intact.  Strength and sensation intact BL, normal cerebellar exam, EOMI. Psych:  Normal mood and affect. Signed:  Leslie Rodgers MD    Part of this note may have been written by using a voice dictation software. The note has been proof read but may still contain some grammatical/other typographical errors. Fabiano Murphy

## 2021-11-19 NOTE — DISCHARGE INSTRUCTIONS
Please discuss with your PCP about getting an order for ECHO WITH BUBBLE to evaluate for a PFO, as this is last study you need to complete your stroke workup. You also had some palpitations during hospitalization but these were unfortunately not captured on telemetry. If you have another episode of palpitations, please discuss with your PCP possibly getting set up with a heart monitor to wear for a couple weeks to ensure that you do not have Atrial Fibrillation. Please start taking crestor instead of simvastatin to treat your TIA, or mini-stroke. Please also start taking Plavix daily for 21 days in addition to your baby aspirin to help treat and prevent your TIA. After 21 days, please then start taking baby aspirin daily. I have increased your home norvasc to 10mg daily to treat your hypertension as you came to the hospital with severely elevated blood pressures. Please start monitoring your blood pressure at home to ensure that it is appropriately controlled. Please discus with your PCP at upcoming appointment about recent hospitalization and medication changes. Stroke: After Your Visit     Your Care Instructions     You have had a stroke. Risk factors for stroke include being overweight, smoking, and sedentary lifestyle. This means that the blood flow to a part of your brain was blocked for some time, which damages the nerve cells in that part of the brain. The part of your body controlled by that part of your brain may not function properly now. The brain is an amazing organ that can heal itself to some degree. The stroke you had damaged part of your brain, but other parts of your brain may take over in some way for the damaged areas. You have already started this process. Going home may be hard for you and your family. The more you can try to do for yourself, the better. Remember to take each day one at a time. Follow-up care is a key part of your treatment and safety.  Be sure to make and go to all appointments, and call your doctor if you are having problems. Its also a good idea to know your test results and keep a list of the medicines you take. How can you care for yourself at home? Enter a stroke rehabilitation (rehab) program, if your doctor recommends it. Physical, speech, and occupational therapies can help you manage bathing, dressing, eating, and other basics of daily living. Eat a heart-healthy diet that is low in cholesterol, saturated fat, and salt. Eat lots of fresh fruits and vegetables and foods high in fiber. Increase your activities slowly. Take short rest breaks when you get tired. Gradually increase the amount you walk. Start out by walking a little more than you did the day before. Do not drive until your doctor says it is okay. It is normal to feel sad or depressed after a stroke. If the blues last, talk to your doctor. If you are having problems with urine leakage, go to the bathroom at regular times, including when you first wake up and at bedtime. Also, limit fluids after dinner. If you are constipated, drink plenty of fluids, enough so that your urine is light yellow or clear like water. If you have kidney, heart, or liver disease and have to limit fluids, talk with your doctor before you increase the amount of fluids you drink. Set up a regular time for using the toilet. If you continue to have constipation, your doctor may suggest using a bulking agent, such as Metamucil, or a stool softener, laxative, or enema. Medicines  Take your medicines exactly as prescribed. Call your doctor if you think you are having a problem with your medicine. You may be taking several medicines. ACE (angiotensin-converting enzyme) inhibitors, angiotensin II receptor blockers (ARBs), beta-blockers, diuretics (water pills), and calcium channel blockers control your blood pressure. Statins help lower cholesterol.  Your doctor may also prescribe medicines for depression, pain, sleep problems, anxiety, or agitation. If your doctor has given you medicine that prevents blood clots, such as warfarin (Coumadin), aspirin combined with extended-release dipyridamole (Aggrenox), clopidogrel (Plavix), or aspirin to prevent another stroke, you should:  Tell your dentist, pharmacist, and other health professionals that you take these medicines. Watch for unusual bruising or bleeding, such as blood in your urine, red or black stools, or bleeding from your nose or gums. Get regular blood tests to check your clotting time if you are taking Coumadin. Wear medical alert jewelry that says you take blood thinners. You can buy this at most drugstores. Do not take any over-the-counter medicines or herbal products without talking to your doctor first.  If you take birth control pills or hormone replacement therapy, talk to your doctor about whether they are right for you. For family members and caregivers  Make the home safe. Set up a room so that your loved one does not have to climb stairs. Be sure the bathroom is on the same floor. Move throw rugs and furniture that could cause falls, and make sure that the lighting is good. Put grab bars and seats in tubs and showers. Find out what your loved one can do and what he or she needs help with. Try not to do things for your loved one that your loved one can do on his or her own. Help him or her learn and practice new skills. Visit and talk with your loved one often. Try doing activities together that you both enjoy, such as playing cards or board games. Keep in touch with your loved one's friends as much as you can, and encourage them to visit. Take care of yourself. Do not try to do everything yourself. Ask other family members to help. Eat well, get enough rest, and take time to do things that you enjoy. Keep up with your own doctor visits, and make sure to take your medicines regularly. Get out of the house as much as you can. Join a local support group. Find out if you qualify for home health care visits to help with rehab or for adult day care. When should you call for help? Call 911 anytime you think you may need emergency care. For example, call if:  You have signs of another stroke. These may include:  Sudden numbness, paralysis, or weakness in your face, arm, or leg, especially on only one side of your body. New problems with walking or balance. Sudden vision changes. Drooling or slurred speech. New problems speaking or understanding simple statements, or you feel confused. A sudden, severe headache that is different from past headaches. Call 911 even if these symptoms go away in a few minutes. You cough up blood. You vomit blood or what looks like coffee grounds. You pass maroon or very bloody stools. Call your doctor now or seek immediate medical care if:  You have new bruises or blood spots under your skin. You have a nosebleed. Your gums bleed when you brush your teeth. You have blood in your urine. Your stools are black and tarlike or have streaks of blood. You have vaginal bleeding when you are not having your period, or heavy period bleeding. You have new symptoms that may be related to your stroke, such as falls or trouble swallowing. Watch closely for changes in your health, and be sure to contact your doctor if you have any problems. Where can you learn more? Go to [employer]    Enter C294  in the search box to learn more about \"Stroke: After Your Visit\". © 9069-4821 [employer], [employer]orporated. Care instructions adapted under license by [employer] (which disclaims liability or warranty for this information). This care instruction is for use with your licensed healthcare professional. If you have questions about a medical condition or this instruction, always ask your healthcare professional. Jasmyne Langston any warranty or liability for your use of this information.

## 2021-11-19 NOTE — PROGRESS NOTES
Left message for RN to complete and sign screening form. Attempting to get MRI done tonight prior to pt going to the floor.

## 2021-11-19 NOTE — PROGRESS NOTES
Care Management Interventions  PCP Verified by CM:  Yes  Support Systems: Spouse/Significant Other  Confirm Follow Up Transport: Family  Discharge Location  Discharge Placement: Home    SW spoke w/ pt., pt is a 77 y/o female admitted for Dysarthria, pt lives w/ her  and has children that lives close by that comes to help out whenever there is a need, pt is normally independent and does all of her housework and prepare food for her and her ,pt drives to and from the grocery store and other outings, pt does not use any DME, has not had any previous falls    Pt's PCP is Dr. Nasreen Chan last seen 6 months ago, pt usually has a well visit every 6 months through Medicare, pt's pharmacy of choice is [facility] on [address], SW will follow pt until d/c    MITCH Fowler

## 2021-11-19 NOTE — ASSESSMENT & PLAN NOTE
- Completely resolved by time of arrival to ED  - Tele-neurology recommended admission for TIA workup  - MRI brain and echo ordered  - Cardiac monitoring  - PT/OT/ST  - Continue ASA, increased statin dosing for now  - BP is very elevated, per report, Neurology would like SBP ~180, restart home meds, prn antihypertensives

## 2021-11-19 NOTE — PROGRESS NOTES
920AM Echo was attempted but patient was just brought down to MRI and would be at least 45 minutes per nurse. Echo will be attempted Saturday morning.

## 2021-11-19 NOTE — PROGRESS NOTES
Admission assessment complete. Patient is alert and oriented, in bed. Oriented to room and call light functions. Respirations are even and unlabored, patient is on room air. Bowel sounds are present. NIH remains 0. No neurological deficit noted. BP elevated, not within parameter for PRN medication. Will continue to monitor. No needs expressed at this time.    Bed low and locked, call light within reach.   _____________________________________    _______   11/18/21 2200   Dual Skin Pressure Injury Assessment   Dual Skin Pressure Injury Assessment WDL   Second Care Provider (Based on [address] [place] [address]) Antonio Duran RN   Skin Integumentary   Skin Integumentary (WDL) WDL    Pressure  Injury Documentation No Pressure Injury Noted-Pressure Ulcer Prevention Initiated   ______________________________      Problem: Hypertension  Goal: *Blood pressure within specified parameters  Outcome: Not Progressing Towards Goal

## 2021-11-19 NOTE — PROGRESS NOTES
SPEECH PATHOLOGY NOTE:    Speech therapy consult received and appreciated. Per chart review, MRI negative for acute infarction. Patient's speech is now back to baseline. Passed STAND and is on a regular consistency diet/thin liquids. Denies any difficulty swallowing and politely declined bedside swallow evaluation. Please re-consult if new concern should arise.      Lynda Nur MS, CCC-SLP

## 2021-11-19 NOTE — ASSESSMENT & PLAN NOTE
- Restart home meds  - Neurology recommended to keep BP<180 given significantly elevated pressures, could be cause of symptoms  - PRN labetalol

## 2021-11-19 NOTE — H&P
Hospitalist History and Physical   Admit Date:  2021  6:30 PM   Name:  Rusty Amezquita   Age:  78 y.o. Sex:  female  :  1945   MRN:  [id number]     Presenting Complaint: slurred speech  Reason(s) for Admission: Dysarthria [R47.1]     History of Present Illness:   Rusty Amezquita is a 78 y.o. female with medical history of HTN, HLD, breast cancer who presented to ED with dysarthria. Patient reports that about 45 minutes prior to arrival to ER, she had some slurred speech and word-finding difficulties. By the time of arrival to ER, her symptoms have completely resolved. CODE S was called upon arrival, and Tele-Neurology was consulted. Patient is not a candidate for tPA given complete resolution of symptoms. Hospitalist to admit for TIA workup. Denies fevers, chills, headache, SOB, nausea, vomiting, dizziness, weakness, sensation loss. Review of Systems:  10 systems reviewed and negative except as noted in HPI. Assessment & Plan:   * Dysarthria  - Completely resolved by time of arrival to ED  - Tele-neurology recommended admission for TIA workup  - MRI brain and echo ordered  - Cardiac monitoring  - PT/OT/ST  - Continue ASA, increased statin dosing for now  - BP is very elevated, per report, Neurology would like SBP ~180, restart home meds, prn antihypertensives    Ductal carcinoma in situ (DCIS) of right breast  - Of note    Dyslipidemia  - Home simvastatin 20mg, increased to atorvastatin 40mg here  - Lipid panel    Hypothyroidism  - Home synthroid  - Check TSH    Hypertension  - Restart home meds  - Neurology recommended to keep BP<180 given significantly elevated pressures, could be cause of symptoms  - PRN labetalol       Disposition: obs    Diet: ADULT DIET Regular  VTE ppx: SCDs  Code status: Full Code      Past medical history reviewed.     Past Medical History:   Diagnosis Date    Abnormal LFTs (liver function tests)     Breast cancer ([facility] [address] 2020    Right     Dyslipidemia     Hyperlipidemia     Hypertension     controlled with meds    Hypothyroidism     levothyroxine daily    Intraductal papilloma of right breast 2019    Urgency of urination     Varicose veins 12     Past surgical history reviewed. Past Surgical History:   Procedure Laterality Date    HX BREAST BIOPSY Right 2019    2 areas    HX BREAST BIOPSY Right 3/4/2020    RIGHT BREAST NEEDLE LOCALIZED LUMPECTOMY/ BIOPSY X2 PREOP 0830/ LOC 1000 performed by Gracy Wise MD at [address] HX BREAST LUMPECTOMY Right 2020    HX COLONOSCOPY  12/10/2012 done    Gustavo---7-10 yr recall    HX DILATION AND CURETTAGE      HX TONSIL AND ADENOIDECTOMY  as child    HX TUBAL LIGATION      MT BREAST SURGERY PROCEDURE UNLISTED      cysts removed x 2 left breast- benign    US BREAST PREOP LOCAL WIRE PLC Right 2020    US BREAST PREOP LOCAL WIRE PLC Right 2020    VASCULAR SURGERY PROCEDURE UNLIST      Varicose vein removal      No Known Allergies   Social History     Tobacco Use    Smoking status: Former Smoker     Packs/day: 0.50     Years: 20.00     Pack years: 10.00     Quit date: 1990     Years since quittin.9    Smokeless tobacco: Never Used   Substance Use Topics    Alcohol use: No     Comment: rarely      Family History   Problem Relation Age of Onset    Cancer Mother         breast and rectal cancer, passed at age 80    Stroke Mother     Diabetes Mother     Breast Cancer Mother 67    Heart Disease Father 50        mi- massive    Heart Attack Father     Cancer Sister         breast    Breast Cancer Sister 62    Heart Disease Brother         mi    Hypertension Brother     Diabetes Brother       Family history reviewed and noncontributory to patient's acute condition; no relevant family history unless otherwise noted above.   Immunization History   Administered Date(s) Administered    COVID-19, PFIZER, MRNA, LNP-S, PF, 30MCG/0.3ML DOSE 2021, 03/04/2021    Influenza High Dose Vaccine PF 10/08/2015, 09/19/2016, 10/19/2017, 09/06/2018, 09/30/2019, 09/01/2020    Influenza Vaccine 01/01/2012, 09/12/2013    Influenza Vaccine (Tri) Adjuvanted (>65 Yrs FLUAD TRI 80690) 09/06/2018, 08/29/2019    Influenza, Quadrivalent, Adjuvanted (>65 Yrs FLUAD QUAD [id number]) 09/04/2020    Pneumococcal Conjugate (PCV-13) 04/12/2017    Pneumococcal Polysaccharide (PPSV-23) 04/17/2018    Pneumococcal Vaccine (Unspecified Type) 01/01/2012    Td 01/01/2012    Tdap 01/01/2012, 11/05/2020    Zoster Recombinant 08/28/2019, 01/30/2020     PTA Medications:  Current Outpatient Medications   Medication Instructions    amitriptyline (ELAVIL) 50 mg, Oral, EVERY BEDTIME    amLODIPine (NORVASC) 5 mg, Oral, DAILY WITH BREAKFAST    anastrozole (ARIMIDEX) 1 mg tablet TAKE 1 TABLET BY MOUTH EVERY DAY    aspirin 81 mg CpDR 1 Each, Oral, EVERY BEDTIME    calcium 1,000 mg, Oral, EVERY BEDTIME    diclofenac (VOLTAREN) 2 g, Topical, 3 TIMES DAILY AS NEEDED, Left wrist    ibuprofen (MOTRIN) 600 mg, Oral, EVERY 6 HOURS AS NEEDED    levothyroxine (SYNTHROID) 75 mcg, Oral, DAILY BEFORE BREAKFAST    multivitamin (ONE A DAY) tablet 1 Tablet, Oral, EVERY BEDTIME    omega-3 fatty acids-vitamin e (FISH OIL) 1,000 mg Cap 1 Capsule, Oral, EVERY BEDTIME    oxybutynin (DITROPAN) 5 mg, Oral, 2 TIMES DAILY    ramipriL (ALTACE) 10 mg, Oral, DAILY    simvastatin (ZOCOR) 20 mg, Oral, EVERY BEDTIME       Objective:     Patient Vitals for the past 24 hrs:   Temp Pulse Resp BP SpO2   11/18/21 2139 97.7 °F (36.5 °C) 67 16 (!) 190/97 94 %   11/18/21 2045  63 18 (!) 211/95    11/18/21 2030  65 24 (!) 179/84    11/18/21 2000  (!) 59 14 (!) 174/82    11/18/21 1930  62 13 (!) 141/70    11/18/21 1925  67  (!) 179/92    11/18/21 1900  78 22 (!) 227/107 96 %   11/18/21 1850 97.8 °F (36.6 °C) 73 20 (!) 164/115 98 %   11/18/21 1841    (!) 203/92      Oxygen Therapy  O2 Sat (%): 94 % (11/18/21 9989)  Pulse via Oximetry: 74 beats per minute (11/18/21 1900)    Estimated body mass index is 24.37 kg/m² as calculated from the following:    Height as of this encounter: 5' 9\" (1.753 m). Weight as of this encounter: 74.8 kg (165 lb). No intake or output data in the 24 hours ending 11/18/21 2217      Physical Exam:  General:    Well nourished. No overt distress  Head:  Normocephalic, atraumatic  Eyes:  Sclerae appear normal.  Pupils equally round. HENT:  Nares appear normal, no drainage. Moist mucous membranes  Neck:  No restricted ROM. Trachea midline  CV:   RRR. S1/S2 auscultated  Lungs:   CTAB. No wheezing, rhonchi, or rales. Appears even, unlabored  Abdomen: Bowel sounds present. Soft, nontender, nondistended. Extremities: Warm and dry. No cyanosis or clubbing. No edema. Skin:     No rashes. Normal turgor. Normal coloration  Neuro:  Cranial nerves II-XII grossly intact. Sensation intact  Psych:  Normal mood and affect. Alert and oriented x3    Data Ordered and Personally Reviewed:    Last 24hr Labs:  Recent Results (from the past 24 hour(s))   PROTHROMBIN TIME + INR    Collection Time: 11/18/21  6:35 PM   Result Value Ref Range    Prothrombin time 12.6 12.6 - 14.5 sec    INR 0.9     PTT    Collection Time: 11/18/21  6:35 PM   Result Value Ref Range    aPTT 28.2 24.1 - 35.1 SEC   CBC WITH AUTOMATED DIFF    Collection Time: 11/18/21  6:35 PM   Result Value Ref Range    WBC 9.0 4.3 - 11.1 K/uL    RBC 5.05 4.05 - 5.2 M/uL    HGB 14.8 11.7 - 15.4 g/dL    HCT 44.9 35.8 - 46.3 %    MCV 88.9 79.6 - 97.8 FL    MCH 29.3 26.1 - 32.9 PG    MCHC 33.0 31.4 - 35.0 g/dL    RDW 13.8 11.9 - 14.6 %    PLATELET 818 861 - 542 K/uL    MPV 9.6 9.4 - 12.3 FL    ABSOLUTE NRBC 0.00 0.0 - 0.2 K/uL    DF AUTOMATED      NEUTROPHILS 57 43 - 78 %    LYMPHOCYTES 32 13 - 44 %    MONOCYTES 7 4.0 - 12.0 %    EOSINOPHILS 3 0.5 - 7.8 %    BASOPHILS 0 0.0 - 2.0 %    IMMATURE GRANULOCYTES 0 0.0 - 5.0 %    ABS. NEUTROPHILS 5.2 1.7 - 8.2 K/UL    ABS. LYMPHOCYTES 2.9 0.5 - 4.6 K/UL    ABS. MONOCYTES 0.6 0.1 - 1.3 K/UL    ABS. EOSINOPHILS 0.3 0.0 - 0.8 K/UL    ABS. BASOPHILS 0.0 0.0 - 0.2 K/UL    ABS. IMM. GRANS. 0.0 0.0 - 0.5 K/UL   METABOLIC PANEL, BASIC    Collection Time: 11/18/21  6:35 PM   Result Value Ref Range    Sodium 138 136 - 145 mmol/L    Potassium 3.8 3.5 - 5.1 mmol/L    Chloride 105 98 - 107 mmol/L    CO2 28 21 - 32 mmol/L    Anion gap 5 (L) 7 - 16 mmol/L    Glucose 101 (H) 65 - 100 mg/dL    BUN 12 8 - 23 MG/DL    Creatinine 0.85 0.6 - 1.0 MG/DL    GFR est AA >60 >60 ml/min/1.73m2    GFR est non-AA >60 >60 ml/min/1.73m2    Calcium 9.1 8.3 - 10.4 MG/DL   TROPONIN-HIGH SENSITIVITY    Collection Time: 11/18/21  6:35 PM   Result Value Ref Range    Troponin-High Sensitivity 14.0 0 - 14 pg/mL   GLUCOSE, POC    Collection Time: 11/18/21  6:35 PM   Result Value Ref Range    Glucose (POC) 100 65 - 100 mg/dL    Performed by Christiano    EKG, 12 LEAD, INITIAL    Collection Time: 11/18/21  7:13 PM   Result Value Ref Range    Ventricular Rate 70 BPM    Atrial Rate 70 BPM    P-R Interval 150 ms    QRS Duration 84 ms    Q-T Interval 430 ms    QTC Calculation (Bezet) 464 ms    Calculated P Axis 45 degrees    Calculated R Axis 74 degrees    Calculated T Axis 89 degrees    Diagnosis         Normal sinus rhythm  diminished anterior forces. Abnormal ECG  When compared with ECG of 18-NOV-2021 13:42,  Sinus rhythm is no longer with 2nd degree A-V block  Right bundle branch block is no longer Present  [id number] on 11/18/2021 9:54:06 PM         All Micro Results     None          Other Studies:  CT CODE NEURO HEAD WO CONTRAST    Result Date: 11/18/2021  History: cva, slurred speech, onset 45 minutes ago. Exam: CT head without contrast Technique: Thin section axial CT images were obtained from the skullbase through the vertex. Radiation dose reduction techniques were used for this study.   Our CT scanners use one or all of the following: Automated exposure control, adjustment of the mA and/or kV according to patient size, use of iterative reconstruction. Findings: The ventricles are normal in size, shape, and position. Chronic appearing white matter change noted in the corona radiata and centrum semiovale. No extra-axial fluid collection is present. There is no mass or mass-effect. The basilar cisterns are patent. The paranasal sinuses and mastoid air cells are clear. Chronic appearing white matter change without acute intracranial abnormality.          Medications Administered     aspirin chewable tablet 324 mg     Admin Date  11/18/2021 Action  Given Dose  324 mg Route  Oral Administered By  Carletha Habermann, RN          labetaloL (NORMODYNE;TRANDATE) injection 20 mg     Admin Date  11/18/2021 Action  Given Dose  20 mg Route  IntraVENous Administered By  Carletha Habermann, RN                  Signed:  Siddhartha Odom MD

## 2021-11-19 NOTE — PROGRESS NOTES
PHYSICAL THERAPY: Initial Assessment 11/19/2021  OBSERVATION: PT Visit Days : 1  [place] / Product Type: Medicare /       NAME/AGE/GENDER: Israel Zamora is a 78 y.o. female   PRIMARY DIAGNOSIS: Dysarthria [R47.1] Dysarthria Dysarthria       ICD-10: Treatment Diagnosis:    · Other abnormalities of gait and mobility (R26.89)   Precaution/Allergies:  Patient has no known allergies. ASSESSMENT:     Ms. Johnna Ayers presents reclined in bed with her  present. She is able to perform bed mobility and transfers with independence in the room including toileting and dressing. She then ambulated down the harrison for 150' with independence and no gait deficits noted. Pt is not in need of skilled PT at this time. This section established at most recent assessment   PROBLEM LIST (Impairments causing functional limitations):  1. No needs   INTERVENTIONS PLANNED: (Benefits and precautions of physical therapy have been discussed with the patient.)  1. no needs     TREATMENT PLAN: Frequency/Duration: No continued therapy       REHAB RECOMMENDATIONS (at time of discharge pending progress):    Placement: It is my opinion, based on this patient's performance to date, that Ms. Johnna Ayers may benefit from being discharged with NO further skilled therapy due to a proven ability to function at baseline. Equipment:    None at this time              HISTORY:   History of Present Injury/Illness (Reason for Referral):  Israel Zamora is a 78 y.o. female with medical history of HTN, HLD, breast cancer who presented to ED with dysarthria. Patient reports that about 45 minutes prior to arrival to ER, she had some slurred speech and word-finding difficulties. By the time of arrival to ER, her symptoms have completely resolved. [facility] S was called upon arrival, and Tele-Neurology was consulted. Patient is not a candidate for tPA given complete resolution of symptoms.   Hospitalist to admit for TIA workup. Past Medical History/Comorbidities:   Ms. Maya Vazquez  has a past medical history of Abnormal LFTs (liver function tests), Breast cancer ([address] (03/04/2020), Dyslipidemia, Hyperlipidemia, Hypertension, Hypothyroidism, Intraductal papilloma of right breast (12/30/2019), Urgency of urination, and Varicose veins (9/17/12). Ms. Maya Vazquez  has a past surgical history that includes pr breast surgery procedure unlisted; hx tonsil and adenoidectomy (as child); hx tubal ligation (1978); vascular surgery procedure unlist; hx dilation and curettage; hx colonoscopy (12/10/2012 done); hx breast biopsy (Right, 12/09/2019); us breast preop local wire plc (Right, 03/04/2020); us breast preop local wire plc (Right, 03/04/2020); hx breast lumpectomy (Right, 03/04/2020); and hx breast biopsy (Right, 3/4/2020). Social History/Living Environment:   Home Environment: Private residence  # Steps to Enter: 1  One/Two Story Residence: One story  Living Alone: No  Support Systems: Spouse/Significant Other  Patient Expects to be Discharged to[employer][de-identified][employer]  Current DME Used/Available at Home: None  Prior Level of Function/Work/Activity:  Pt is independent at baseline     Number of Personal Factors/Comorbidities that affect the Plan of Care: 1-2: MODERATE COMPLEXITY   EXAMINATION:   Most Recent Physical Functioning:   Gross Assessment:                  Posture:     Balance:  Sitting: Intact  Standing: Intact Bed Mobility:  Rolling: Independent  Supine to Sit: Independent  Sit to Supine: Independent  Scooting: Independent  Wheelchair Mobility:     Transfers:  Sit to Stand: Independent  Stand to Sit: Independent  Gait:     Distance (ft): 150 Feet (ft)  Ambulation - Level of Assistance: Independent      Body Structures Involved:  1. None Body Functions Affected:  1. None Activities and Participation Affected:  1.  None   Number of elements that affect the Plan of Care: 1-2: LOW COMPLEXITY   CLINICAL PRESENTATION:   Presentation: Stable and uncomplicated: LOW COMPLEXITY   CLINICAL DECISION MAKIN[address] [place] [address] AM-PAC 6 Clicks   Basic Mobility Inpatient Short Form  How much difficulty does the patient currently have. .. Unable A Lot A Little None   1. Turning over in bed (including adjusting bedclothes, sheets and blankets)? [] 1   [] 2   [] 3   [x] 4   2. Sitting down on and standing up from a chair with arms ( e.g., wheelchair, bedside commode, etc.)   [] 1   [] 2   [] 3   [x] 4   3. Moving from lying on back to sitting on the side of the bed? [] 1   [] 2   [] 3   [x] 4   How much help from another person does the patient currently need. .. Total A Lot A Little None   4. Moving to and from a bed to a chair (including a wheelchair)? [] 1   [] 2   [] 3   [x] 4   5. Need to walk in hospital room? [] 1   [] 2   [] 3   [x] 4   6. Climbing 3-5 steps with a railing? [] 1   [] 2   [] 3   [x] 4   © , Trustees of [address] [place] [address], under license to [employer]. All rights reserved      Score:  Initial: 24 Most Recent: X (Date: -- )    Interpretation of Tool:  Represents activities that are increasingly more difficult (i.e. Bed mobility, Transfers, Gait). Medical Necessity:     · Pt is functioning at her baseline and not in need of continued therapy at this time. .     Use of outcome tool(s) and clinical judgement create a POC that gives a: Clear prediction of patient's progress: LOW COMPLEXITY            TREATMENT:   (In addition to Assessment/Re-Assessment sessions the following treatments were rendered)   Pre-treatment Symptoms/Complaints:  No complaints  Pain: Initial:   Pain Intensity 1: 0  Post Session:  0     Assessment/Reassessment only, no treatment provided today    Braces/Orthotics/Lines/Etc:   · O2 Device: None (Room air)  Treatment/Session Assessment:    · Response to Treatment:  Tolerated treatment well, functioning at baseline  · Interdisciplinary Collaboration:   o Physical Therapist  o Occupational Therapist  o Registered Nurse  o Physician  · After treatment position/precautions:   o Supine in bed  o Bed/Chair-wheels locked  o Bed in low position  o Call light within reach  o Family at bedside  o MD at bedside   · Compliance with Program/Exercises: Compliant all of the time  · Recommendations/Intent for next treatment session:  DC from PT services  Total Treatment Duration:  PT Patient Time In/Time Out  Time In: 1110  Time Out: [address] [place], [place]

## 2021-11-19 NOTE — PROGRESS NOTES
Problem: Self Care Deficits Care Plan (Adult)  Goal: *Acute Goals and Plan of Care (Insert Text)  Outcome: Resolved/Met     OCCUPATIONAL THERAPY: Initial Assessment and Discharge 11/19/2021  OBSERVATION: OT Visit Days: 1  [place] / Product Type: Medicare /      NAME/AGE/GENDER: Giovanni Lujan is a 78 y.o. female   PRIMARY DIAGNOSIS:  Dysarthria [R47.1] Dysarthria Dysarthria        ICD-10: Treatment Diagnosis:    Generalized Muscle Weakness (M62.81)   Precautions/Allergies:   Patient has no known allergies. ASSESSMENT:     Ms. García Mcknight presents with dysarthria that has resolved at the time of evaluation. Pt lives with spouse in 1 level home with walk in shower. Pt independent with ADLs at baseline. Today, pt able to don clothes and mobilize in hallway independently. Pt functioning back at her baseline. No further skilled OT during session. This section established at most recent assessment   PROBLEM LIST (Impairments causing functional limitations):  None   INTERVENTIONS PLANNED: (Benefits and precautions of occupational therapy have been discussed with the patient.)  None     TREATMENT PLAN: Frequency/Duration: No further skilled OT indicated at this time. REHAB RECOMMENDATIONS (at time of discharge pending progress):    Placement: It is my opinion, based on this patient's performance to date, that Ms. García Mcknight may benefit from being discharged with NO further skilled therapy due to a proven ability to function at baseline.   Equipment:   None at this time              OCCUPATIONAL PROFILE AND HISTORY:   History of Present Injury/Illness (Reason for Referral):  See H&P  Past Medical History/Comorbidities:   Ms. García Mcknight  has a past medical history of Abnormal LFTs (liver function tests), Breast cancer ([facility] [address] (03/04/2020), Dyslipidemia, Hyperlipidemia, Hypertension, Hypothyroidism, Intraductal papilloma of right breast (12/30/2019), Urgency of urination, and Varicose veins (9/17/12). Ms. Otila Kocher  has a past surgical history that includes pr breast surgery procedure unlisted; hx tonsil and adenoidectomy (as child); hx tubal ligation (1978); vascular surgery procedure unlist; hx dilation and curettage; hx colonoscopy (12/10/2012 done); hx breast biopsy (Right, 12/09/2019); us breast preop local wire plc (Right, 03/04/2020); us breast preop local wire plc (Right, 03/04/2020); hx breast lumpectomy (Right, 03/04/2020); and hx breast biopsy (Right, 3/4/2020).   Social History/Living Environment:   Home Environment: Private residence  # Steps to Enter: 1  One/Two Story Residence: One story  Living Alone: No  Support Systems: Spouse/Significant Other  Patient Expects to be Discharged to[de-identified] [employer]  Current DME Used/Available at Home: None  Prior Level of Function/Work/Activity:  Independent, lives with spouse     Number of Personal Factors/Comorbidities that affect the Plan of Care: Brief history (0):  LOW COMPLEXITY   ASSESSMENT OF OCCUPATIONAL PERFORMANCE[de-identified]   Activities of Daily Living:   Basic ADLs (From Assessment) Complex ADLs (From Assessment)   Feeding: Independent  Oral Facial Hygiene/Grooming: Independent  Bathing: Independent  Upper Body Dressing: Independent  Lower Body Dressing: Independent  Toileting: Independent     Grooming/Bathing/Dressing Activities of Daily Living     Cognitive Retraining  Safety/Judgement: Awareness of environment                       Bed/Mat Mobility  Rolling: Independent  Supine to Sit: Independent  Sit to Supine: Independent  Sit to Stand: Independent  Stand to Sit: Independent  Scooting: Independent     Most Recent Physical Functioning:   Gross Assessment:                  Posture:     Balance:  Sitting: Intact  Standing: Intact Bed Mobility:  Rolling: Independent  Supine to Sit: Independent  Sit to Supine: Independent  Scooting: Independent  Wheelchair Mobility:     Transfers:  Sit to Stand: Independent  Stand to Sit: Independent            Patient Vitals for the past 6 hrs:   BP BP Patient Position SpO2 Pulse   21 1109 137/72 At rest 95 % 74       Mental Status  Neurologic State: Alert  Orientation Level: Oriented X4  Cognition: Appropriate decision making  Perception: Appears intact  Perseveration: No perseveration noted  Safety/Judgement: Awareness of environment                          Physical Skills Involved:  None Cognitive Skills Affected (resulting in the inability to perform in a timely and safe manner):  [facility] Psychosocial Skills Affected:  Environmental Adaptation   Number of elements that affect the Plan of Care: 1-3:  LOW COMPLEXITY   CLINICAL DECISION MAKIN[address] [place] [address] AM-PAC 6 Clicks   Daily Activity Inpatient Short Form  How much help from another person does the patient currently need. .. Total A Lot A Little None   1. Putting on and taking off regular lower body clothing? [] 1   [] 2   [] 3   [x] 4   2. Bathing (including washing, rinsing, drying)? [] 1   [] 2   [] 3   [x] 4   3. Toileting, which includes using toilet, bedpan or urinal?   [] 1   [] 2   [] 3   [x] 4   4. Putting on and taking off regular upper body clothing? [] 1   [] 2   [] 3   [x] 4   5. Taking care of personal grooming such as brushing teeth? [] 1   [] 2   [] 3   [x] 4   6. Eating meals? [] 1   [] 2   [] 3   [x] 4   © , Trustees of [address] [place] [address], under license to [employer]. All rights reserved      Score:  Initial: 24 Most Recent: X (Date: -- )    Interpretation of Tool:  Represents activities that are increasingly more difficult (i.e. Bed mobility, Transfers, Gait). Medical Necessity:     No further skilled OT indicated at this time.    Reason for Services/Other Comments:  See for initial evaluation   Use of outcome tool(s) and clinical judgement create a POC that gives a: LOW COMPLEXITY         TREATMENT:   (In addition to Assessment/Re-Assessment sessions the following treatments were rendered)     Pre-treatment Symptoms/Complaints:    Pain: Initial:   Pain Intensity 1: 0  Post Session:  0     Assessment/Reassessment only, no treatment provided today    Braces/Orthotics/Lines/Etc:   O2 Device: None (Room air)  Treatment/Session Assessment:    Response to Treatment:  Good, supine in bed  Interdisciplinary Collaboration:   Physical Therapist  Occupational Therapist  Registered Nurse  After treatment position/precautions:   Supine in bed  Bed/Chair-wheels locked  Bed in low position  Call light within reach  RN notified  Family at bedside  MD at bedside   Compliance with Program/Exercises: Compliant all of the time. Recommendations/Intent for next treatment session:  No further skilled OT indicated at this time.    Total Treatment Duration:  OT Patient Time In/Time Out  Time In: 1105  Time Out: Kesha Jasso

## 2021-11-30 ENCOUNTER — TRANSCRIBE ORDER (OUTPATIENT)
Dept: SCHEDULING | Age: 76
End: 2021-11-30

## 2021-11-30 DIAGNOSIS — Z12.31 VISIT FOR SCREENING MAMMOGRAM: Primary | ICD-10-CM

## 2021-12-18 ENCOUNTER — HOSPITAL ENCOUNTER (OUTPATIENT)
Dept: MAMMOGRAPHY | Age: 76
Discharge: HOME | End: 2021-12-18
Attending: INTERNAL MEDICINE
Payer: MEDICARE

## 2021-12-18 DIAGNOSIS — Z12.31 VISIT FOR SCREENING MAMMOGRAM: ICD-10-CM

## 2021-12-18 PROCEDURE — 77063 BREAST TOMOSYNTHESIS BI: CPT

## 2022-02-01 PROBLEM — G45.9 TIA (TRANSIENT ISCHEMIC ATTACK): Status: ACTIVE | Noted: 2022-02-01

## 2022-02-08 NOTE — PROGRESS NOTES
Patient pre-assessment complete for [facility] scheduled for loop, arrival time 0630. Patient verified using . Patient instructed to bring a list of all home medications on the day of procedure. NPO status reinforced. Instructed they can take all other medications excluding vitamins & supplements. Patient verbalizes understanding of all instructions & denies any questions at this time.

## 2022-02-09 ENCOUNTER — HOSPITAL ENCOUNTER (OUTPATIENT)
Age: 77
Setting detail: OUTPATIENT SURGERY
Discharge: HOME | End: 2022-02-09
Attending: INTERNAL MEDICINE | Admitting: INTERNAL MEDICINE
Payer: MEDICARE

## 2022-02-09 VITALS
WEIGHT: 169 LBS | HEART RATE: 65 BPM | TEMPERATURE: 98.6 F | OXYGEN SATURATION: 98 % | DIASTOLIC BLOOD PRESSURE: 78 MMHG | SYSTOLIC BLOOD PRESSURE: 149 MMHG | HEIGHT: 70 IN | BODY MASS INDEX: 24.2 KG/M2

## 2022-02-09 DIAGNOSIS — G45.9 TIA (TRANSIENT ISCHEMIC ATTACK): ICD-10-CM

## 2022-02-09 LAB
ALBUMIN SERPL-MCNC: 4 G/DL (ref 3.2–4.6)
ALBUMIN/GLOB SERPL: 1.3 {RATIO} (ref 1.2–3.5)
ALP SERPL-CCNC: 91 U/L (ref 50–136)
ALT SERPL-CCNC: 35 U/L (ref 12–65)
ANION GAP SERPL CALC-SCNC: 4 MMOL/L (ref 7–16)
AST SERPL-CCNC: 21 U/L (ref 15–37)
ATRIAL RATE: 65 BPM
BILIRUB SERPL-MCNC: 0.7 MG/DL (ref 0.2–1.1)
BUN SERPL-MCNC: 11 MG/DL (ref 8–23)
CALCIUM SERPL-MCNC: 9.4 MG/DL (ref 8.3–10.4)
CALCULATED P AXIS, ECG09: 40 DEGREES
CALCULATED R AXIS, ECG10: 68 DEGREES
CALCULATED T AXIS, ECG11: 99 DEGREES
CHLORIDE SERPL-SCNC: 108 MMOL/L (ref 98–107)
CO2 SERPL-SCNC: 30 MMOL/L (ref 21–32)
CREAT SERPL-MCNC: 1 MG/DL (ref 0.6–1)
DIAGNOSIS, 93000: NORMAL
ERYTHROCYTE [DISTWIDTH] IN BLOOD BY AUTOMATED COUNT: 14.3 % (ref 11.9–14.6)
GLOBULIN SER CALC-MCNC: 3 G/DL (ref 2.3–3.5)
GLUCOSE SERPL-MCNC: 106 MG/DL (ref 65–100)
HCT VFR BLD AUTO: 41.9 % (ref 35.8–46.3)
HGB BLD-MCNC: 13.5 G/DL (ref 11.7–15.4)
MCH RBC QN AUTO: 28.7 PG (ref 26.1–32.9)
MCHC RBC AUTO-ENTMCNC: 32.2 G/DL (ref 31.4–35)
MCV RBC AUTO: 89.1 FL (ref 79.6–97.8)
NRBC # BLD: 0 K/UL (ref 0–0.2)
P-R INTERVAL, ECG05: 170 MS
PLATELET # BLD AUTO: 250 K/UL (ref 150–450)
PMV BLD AUTO: 9 FL (ref 9.4–12.3)
POTASSIUM SERPL-SCNC: 3.9 MMOL/L (ref 3.5–5.1)
PROT SERPL-MCNC: 7 G/DL (ref 6.3–8.2)
Q-T INTERVAL, ECG07: 446 MS
QRS DURATION, ECG06: 90 MS
QTC CALCULATION (BEZET), ECG08: 463 MS
RBC # BLD AUTO: 4.7 M/UL (ref 4.05–5.2)
SODIUM SERPL-SCNC: 142 MMOL/L (ref 136–145)
VENTRICULAR RATE, ECG03: 65 BPM
WBC # BLD AUTO: 6.9 K/UL (ref 4.3–11.1)

## 2022-02-09 PROCEDURE — 74011000250 HC RX REV CODE- 250: Performed by: INTERNAL MEDICINE

## 2022-02-09 PROCEDURE — 80053 COMPREHEN METABOLIC PANEL: CPT

## 2022-02-09 PROCEDURE — C1764 EVENT RECORDER, CARDIAC: HCPCS | Performed by: INTERNAL MEDICINE

## 2022-02-09 PROCEDURE — 33285 INSJ SUBQ CAR RHYTHM MNTR: CPT | Performed by: INTERNAL MEDICINE

## 2022-02-09 PROCEDURE — 85027 COMPLETE CBC AUTOMATED: CPT

## 2022-02-09 PROCEDURE — 77030008462 HC STPLR SKN PROX J&J -A: Performed by: INTERNAL MEDICINE

## 2022-02-09 PROCEDURE — 93005 ELECTROCARDIOGRAM TRACING: CPT | Performed by: INTERNAL MEDICINE

## 2022-02-09 PROCEDURE — 77030041397 HC DRSG PRIMASEAL AG MDII -B: Performed by: INTERNAL MEDICINE

## 2022-02-09 RX ORDER — SODIUM CHLORIDE 9 MG/ML
75 INJECTION, SOLUTION INTRAVENOUS CONTINUOUS
Status: DISCONTINUED | OUTPATIENT
Start: 2022-02-09 | End: 2022-02-09 | Stop reason: HOSPADM

## 2022-02-09 RX ORDER — LIDOCAINE HYDROCHLORIDE 10 MG/ML
INJECTION, SOLUTION EPIDURAL; INFILTRATION; INTRACAUDAL; PERINEURAL AS NEEDED
Status: DISCONTINUED | OUTPATIENT
Start: 2022-02-09 | End: 2022-02-09 | Stop reason: HOSPADM

## 2022-02-09 NOTE — PROGRESS NOTES
Device interrogation reviewed. Normal function. No new or significant events noted.      Thank you,   Darylene Hoyle, MD  02/09/22  12:28 PM

## 2022-02-09 NOTE — Clinical Note
TRANSFER - IN REPORT:     Verbal report received from: kamran. Report consisted of patient's Situation, Background, Assessment and   Recommendations(SBAR). Opportunity for questions and clarification was provided. Assessment completed upon patient's arrival to unit and care assumed.

## 2022-02-09 NOTE — PROGRESS NOTES
Discharge instructions reviewed with patient including post cath care and medications side effects. Time allowed for questions and answers. INT removed with cath intact.

## 2022-02-09 NOTE — DISCHARGE INSTRUCTIONS
LOOP MONITOR INSTRUCTIONS SHEET      Activity  · As tolerated and directed by your doctor  · Bathe or shower as directed by your doctor    Diet  · Resume your previous diet     Pain  ·  Call your doctor if pain is NOT relieved by OTC medication    Dressing Care: Leave dressing on the incision  . If dressing becomes loose,  You may remove it. Keep area Clean & dry, Do not get incision wet or  let water run over incision. Do not apply any lotions, creams or powder to incision. Follow-Up Phone Calls  · Will be made nursing staff  · If you have any problems, call your doctor as needed    Call your doctor if  · Excessive bleeding that does not stop after holding mild pressure over the area  · Temperature of 101 degrees F or above  · Redness,excessive swelling or bruising, and/or green or yellow, smelly discharge from incision    After Anesthesia  · For the first 24 hours: DO NOT Drive, Drink alcoholic beverages, or Make important decisions  · Be aware of dizziness following anesthesia and while taking pain medication    Medications - Continue home medications as previously prescribed     Other Instructions- Always show the doctor or dentist your loop recorder identification card.       Appointment date/time - February 23 at 1100am    Your doctor's phone number - [phone]

## 2022-02-09 NOTE — Clinical Note
TRANSFER - OUT REPORT:     Verbal report given to: kamran. Report consisted of patient's Situation, Background, Assessment and   Recommendations(SBAR). Opportunity for questions and clarification was provided.

## 2022-03-18 PROBLEM — G45.9 TIA (TRANSIENT ISCHEMIC ATTACK): Status: ACTIVE | Noted: 2022-02-01

## 2022-03-18 PROBLEM — D05.11 DUCTAL CARCINOMA IN SITU (DCIS) OF RIGHT BREAST: Status: ACTIVE | Noted: 2019-12-30

## 2022-03-18 PROBLEM — I10 ESSENTIAL HYPERTENSION WITH GOAL BLOOD PRESSURE LESS THAN 130/85: Status: ACTIVE | Noted: 2018-04-17

## 2022-03-19 PROBLEM — F51.04 CHRONIC INSOMNIA: Status: ACTIVE | Noted: 2018-04-17

## 2022-03-19 PROBLEM — R47.1 DYSARTHRIA: Status: ACTIVE | Noted: 2021-11-18

## 2022-03-19 PROBLEM — N32.81 OAB (OVERACTIVE BLADDER): Status: ACTIVE | Noted: 2018-04-17

## 2022-05-09 ENCOUNTER — HOSPITAL ENCOUNTER (OUTPATIENT)
Dept: LAB | Age: 77
Discharge: HOME | End: 2022-05-09
Payer: MEDICARE

## 2022-05-09 DIAGNOSIS — D05.11 DUCTAL CARCINOMA IN SITU (DCIS) OF RIGHT BREAST: ICD-10-CM

## 2022-05-09 DIAGNOSIS — N32.81 OAB (OVERACTIVE BLADDER): ICD-10-CM

## 2022-05-09 DIAGNOSIS — Z12.31 BREAST CANCER SCREENING BY MAMMOGRAM: Primary | ICD-10-CM

## 2022-05-09 DIAGNOSIS — Z79.811 AROMATASE INHIBITOR USE: ICD-10-CM

## 2022-05-09 LAB
ALBUMIN SERPL-MCNC: 4.1 G/DL (ref 3.2–4.6)
ALBUMIN/GLOB SERPL: 1.2 {RATIO} (ref 1.2–3.5)
ALP SERPL-CCNC: 109 U/L (ref 50–136)
ALT SERPL-CCNC: 31 U/L (ref 12–65)
ANION GAP SERPL CALC-SCNC: 4 MMOL/L (ref 7–16)
AST SERPL-CCNC: 16 U/L (ref 15–37)
BASOPHILS # BLD: 0 K/UL (ref 0–0.2)
BASOPHILS NFR BLD: 0 % (ref 0–2)
BILIRUB SERPL-MCNC: 0.7 MG/DL (ref 0.2–1.1)
BUN SERPL-MCNC: 13 MG/DL (ref 8–23)
CALCIUM SERPL-MCNC: 9.9 MG/DL (ref 8.3–10.4)
CHLORIDE SERPL-SCNC: 106 MMOL/L (ref 98–107)
CO2 SERPL-SCNC: 30 MMOL/L (ref 21–32)
CREAT SERPL-MCNC: 1 MG/DL (ref 0.6–1)
DIFFERENTIAL METHOD BLD: ABNORMAL
EOSINOPHIL # BLD: 0.4 K/UL (ref 0–0.8)
EOSINOPHIL NFR BLD: 5 % (ref 0.5–7.8)
ERYTHROCYTE [DISTWIDTH] IN BLOOD BY AUTOMATED COUNT: 13.8 % (ref 11.9–14.6)
GLOBULIN SER CALC-MCNC: 3.4 G/DL (ref 2.3–3.5)
GLUCOSE SERPL-MCNC: 89 MG/DL (ref 65–100)
HCT VFR BLD AUTO: 46.4 % (ref 35.8–46.3)
HGB BLD-MCNC: 14.9 G/DL (ref 11.7–15.4)
IMM GRANULOCYTES # BLD AUTO: 0 K/UL (ref 0–0.5)
IMM GRANULOCYTES NFR BLD AUTO: 0 % (ref 0–5)
LYMPHOCYTES # BLD: 2.2 K/UL (ref 0.5–4.6)
LYMPHOCYTES NFR BLD: 30 % (ref 13–44)
MCH RBC QN AUTO: 29.3 PG (ref 26.1–32.9)
MCHC RBC AUTO-ENTMCNC: 32.1 G/DL (ref 31.4–35)
MCV RBC AUTO: 91.3 FL (ref 79.6–97.8)
MONOCYTES # BLD: 0.6 K/UL (ref 0.1–1.3)
MONOCYTES NFR BLD: 7 % (ref 4–12)
NEUTS SEG # BLD: 4.3 K/UL (ref 1.7–8.2)
NEUTS SEG NFR BLD: 58 % (ref 43–78)
NRBC # BLD: 0 K/UL (ref 0–0.2)
PLATELET # BLD AUTO: 263 K/UL (ref 150–450)
PMV BLD AUTO: 9.3 FL (ref 9.4–12.3)
POTASSIUM SERPL-SCNC: 4.4 MMOL/L (ref 3.5–5.1)
PROT SERPL-MCNC: 7.5 G/DL (ref 6.3–8.2)
RBC # BLD AUTO: 5.08 M/UL (ref 4.05–5.2)
SODIUM SERPL-SCNC: 140 MMOL/L (ref 136–145)
WBC # BLD AUTO: 7.5 K/UL (ref 4.3–11.1)

## 2022-05-09 PROCEDURE — 80053 COMPREHEN METABOLIC PANEL: CPT

## 2022-05-09 PROCEDURE — 36415 COLL VENOUS BLD VENIPUNCTURE: CPT

## 2022-05-09 PROCEDURE — 85025 COMPLETE CBC W/AUTO DIFF WBC: CPT

## 2022-05-22 DIAGNOSIS — E03.9 HYPOTHYROIDISM, UNSPECIFIED TYPE: ICD-10-CM

## 2022-05-22 DIAGNOSIS — E78.5 DYSLIPIDEMIA: ICD-10-CM

## 2022-05-22 DIAGNOSIS — I10 ESSENTIAL HYPERTENSION WITH GOAL BLOOD PRESSURE LESS THAN 130/85: Primary | ICD-10-CM

## 2022-06-03 ENCOUNTER — NURSE ONLY (OUTPATIENT)
Dept: INTERNAL MEDICINE CLINIC | Facility: CLINIC | Age: 77
End: 2022-06-03

## 2022-06-03 DIAGNOSIS — E78.5 DYSLIPIDEMIA: ICD-10-CM

## 2022-06-03 DIAGNOSIS — I10 ESSENTIAL HYPERTENSION WITH GOAL BLOOD PRESSURE LESS THAN 130/85: ICD-10-CM

## 2022-06-03 DIAGNOSIS — E03.9 HYPOTHYROIDISM, UNSPECIFIED TYPE: ICD-10-CM

## 2022-06-03 DIAGNOSIS — D05.11 DUCTAL CARCINOMA IN SITU (DCIS) OF RIGHT BREAST: ICD-10-CM

## 2022-06-04 LAB
ALBUMIN SERPL-MCNC: 4.3 G/DL (ref 3.2–4.6)
ALBUMIN/GLOB SERPL: 1.6 {RATIO} (ref 1.2–3.5)
ALP SERPL-CCNC: 106 U/L (ref 50–136)
ALT SERPL-CCNC: 27 U/L (ref 12–65)
ANION GAP SERPL CALC-SCNC: 11 MMOL/L (ref 7–16)
AST SERPL-CCNC: 16 U/L (ref 15–37)
BASOPHILS # BLD: 0 K/UL (ref 0–0.2)
BASOPHILS NFR BLD: 1 % (ref 0–2)
BILIRUB SERPL-MCNC: 0.6 MG/DL (ref 0.2–1.1)
BUN SERPL-MCNC: 11 MG/DL (ref 8–23)
CALCIUM SERPL-MCNC: 9.9 MG/DL (ref 8.3–10.4)
CHLORIDE SERPL-SCNC: 106 MMOL/L (ref 98–107)
CHOLEST SERPL-MCNC: 144 MG/DL
CO2 SERPL-SCNC: 26 MMOL/L (ref 21–32)
CREAT SERPL-MCNC: 0.9 MG/DL (ref 0.6–1)
DIFFERENTIAL METHOD BLD: NORMAL
EOSINOPHIL # BLD: 0.4 K/UL (ref 0–0.8)
EOSINOPHIL NFR BLD: 5 % (ref 0.5–7.8)
ERYTHROCYTE [DISTWIDTH] IN BLOOD BY AUTOMATED COUNT: 14 % (ref 11.9–14.6)
GLOBULIN SER CALC-MCNC: 2.7 G/DL (ref 2.3–3.5)
GLUCOSE SERPL-MCNC: 84 MG/DL (ref 65–100)
HCT VFR BLD AUTO: 46 % (ref 35.8–46.3)
HDLC SERPL-MCNC: 65 MG/DL (ref 40–60)
HDLC SERPL: 2.2 {RATIO}
HGB BLD-MCNC: 14.6 G/DL (ref 11.7–15.4)
IMM GRANULOCYTES # BLD AUTO: 0 K/UL (ref 0–0.5)
IMM GRANULOCYTES NFR BLD AUTO: 0 % (ref 0–5)
LDLC SERPL CALC-MCNC: 56 MG/DL
LYMPHOCYTES # BLD: 2.4 K/UL (ref 0.5–4.6)
LYMPHOCYTES NFR BLD: 31 % (ref 13–44)
MCH RBC QN AUTO: 29.5 PG (ref 26.1–32.9)
MCHC RBC AUTO-ENTMCNC: 31.7 G/DL (ref 31.4–35)
MCV RBC AUTO: 92.9 FL (ref 79.6–97.8)
MONOCYTES # BLD: 0.6 K/UL (ref 0.1–1.3)
MONOCYTES NFR BLD: 7 % (ref 4–12)
NEUTS SEG # BLD: 4.3 K/UL (ref 1.7–8.2)
NEUTS SEG NFR BLD: 56 % (ref 43–78)
NRBC # BLD: 0 K/UL (ref 0–0.2)
PLATELET # BLD AUTO: 295 K/UL (ref 150–450)
PMV BLD AUTO: 9.7 FL (ref 9.4–12.3)
POTASSIUM SERPL-SCNC: 4.2 MMOL/L (ref 3.5–5.1)
PROT SERPL-MCNC: 7 G/DL (ref 6.3–8.2)
RBC # BLD AUTO: 4.95 M/UL (ref 4.05–5.2)
SODIUM SERPL-SCNC: 143 MMOL/L (ref 136–145)
TRIGL SERPL-MCNC: 115 MG/DL (ref 35–150)
TSH, 3RD GENERATION: 4.5 UIU/ML (ref 0.36–3.74)
VLDLC SERPL CALC-MCNC: 23 MG/DL (ref 6–23)
WBC # BLD AUTO: 7.7 K/UL (ref 4.3–11.1)

## 2022-06-09 ENCOUNTER — OFFICE VISIT (OUTPATIENT)
Dept: INTERNAL MEDICINE CLINIC | Facility: CLINIC | Age: 77
End: 2022-06-09
Payer: MEDICARE

## 2022-06-09 VITALS
OXYGEN SATURATION: 97 % | DIASTOLIC BLOOD PRESSURE: 80 MMHG | BODY MASS INDEX: 24.2 KG/M2 | TEMPERATURE: 97.3 F | HEIGHT: 70 IN | SYSTOLIC BLOOD PRESSURE: 130 MMHG | HEART RATE: 71 BPM | WEIGHT: 169 LBS

## 2022-06-09 DIAGNOSIS — E03.9 HYPOTHYROIDISM, UNSPECIFIED TYPE: ICD-10-CM

## 2022-06-09 DIAGNOSIS — E78.5 DYSLIPIDEMIA: Primary | ICD-10-CM

## 2022-06-09 DIAGNOSIS — I10 ESSENTIAL HYPERTENSION WITH GOAL BLOOD PRESSURE LESS THAN 130/85: ICD-10-CM

## 2022-06-09 PROCEDURE — G8420 CALC BMI NORM PARAMETERS: HCPCS | Performed by: PHYSICIAN ASSISTANT

## 2022-06-09 PROCEDURE — 1123F ACP DISCUSS/DSCN MKR DOCD: CPT | Performed by: PHYSICIAN ASSISTANT

## 2022-06-09 PROCEDURE — 1036F TOBACCO NON-USER: CPT | Performed by: PHYSICIAN ASSISTANT

## 2022-06-09 PROCEDURE — G8427 DOCREV CUR MEDS BY ELIG CLIN: HCPCS | Performed by: PHYSICIAN ASSISTANT

## 2022-06-09 PROCEDURE — 99213 OFFICE O/P EST LOW 20 MIN: CPT | Performed by: PHYSICIAN ASSISTANT

## 2022-06-09 PROCEDURE — 3017F COLORECTAL CA SCREEN DOC REV: CPT | Performed by: PHYSICIAN ASSISTANT

## 2022-06-09 PROCEDURE — G8399 PT W/DXA RESULTS DOCUMENT: HCPCS | Performed by: PHYSICIAN ASSISTANT

## 2022-06-09 PROCEDURE — 1090F PRES/ABSN URINE INCON ASSESS: CPT | Performed by: PHYSICIAN ASSISTANT

## 2022-06-09 RX ORDER — ROSUVASTATIN CALCIUM 20 MG/1
20 TABLET, COATED ORAL NIGHTLY
Qty: 90 TABLET | Refills: 3 | Status: SHIPPED | OUTPATIENT
Start: 2022-06-09

## 2022-06-09 ASSESSMENT — PATIENT HEALTH QUESTIONNAIRE - PHQ9
SUM OF ALL RESPONSES TO PHQ QUESTIONS 1-9: 0
SUM OF ALL RESPONSES TO PHQ QUESTIONS 1-9: 0
1. LITTLE INTEREST OR PLEASURE IN DOING THINGS: 0
2. FEELING DOWN, DEPRESSED OR HOPELESS: 0
SUM OF ALL RESPONSES TO PHQ QUESTIONS 1-9: 0
SUM OF ALL RESPONSES TO PHQ9 QUESTIONS 1 & 2: 0
SUM OF ALL RESPONSES TO PHQ QUESTIONS 1-9: 0

## 2022-06-09 ASSESSMENT — ENCOUNTER SYMPTOMS: SHORTNESS OF BREATH: 0

## 2022-06-09 NOTE — PATIENT INSTRUCTIONS
Ensure proper dosing technique for thyroid medication - take first thing in the AM on empty stomach with water only & wait 45-60 minutes before eating/drinking/taking ANY other medication. Consider taking it in the early AM hours when she wakes up to urinate.   Continue chronic medications as prescribed  Monitor blood pressure 2-4 times/month to be sure it is consistently staying below goal of 130/85

## 2022-06-09 NOTE — PROGRESS NOTES
Soraya Mackay (:  1945) is a 78 y.o. female,Established patient, here for evaluation of the following chief complaint(s):  Follow-up (Hyperlipidemia, Hypertension, Hypothyroidism)         ASSESSMENT/PLAN:  1. Dyslipidemia  -     rosuvastatin (CRESTOR) 20 MG tablet; Take 1 tablet by mouth at bedtime, Disp-90 tablet, R-3Print  -     Comprehensive Metabolic Panel; Future  -     Lipid Panel; Future  2. Hypothyroidism, unspecified type  -     TSH; Future  3. Essential hypertension with goal blood pressure less than 130/85  -     CBC with Auto Differential; Future  -     Comprehensive Metabolic Panel; Future  -     Urinalysis with Reflex to Culture; Future      Patient Instructions   Ensure proper dosing technique for thyroid medication - take first thing in the AM on empty stomach with water only & wait 45-60 minutes before eating/drinking/taking ANY other medication. Consider taking it in the early AM hours when she wakes up to urinate. Continue chronic medications as prescribed  Monitor blood pressure 2-4 times/month to be sure it is consistently staying below goal of 130/85          Return in about 6 months (around 2022) for MWE + routine f/u w/ Dr Kvng Barr. Subjective   SUBJECTIVE/OBJECTIVE:  HPI  The patient is a 78 y.o. female who is seen for follow up of hypothyroidism. Current symptoms: none. Symptoms are basically asymptomatic. The patient is following treatment regimen with good compliance. Current treatment regimen consists of Levothyroxine 75 mcg daily and is taking it first thing in the AM on an empty stomach but has coffee immediately after. TSH   Date Value Ref Range Status   2021 2.570 uIU/mL Final   2021 5.450 (H) 0.450 - 4.500 uIU/mL Final   2021 3.990 0.450 - 4.500 uIU/mL Final       The patient is a 78 y.o. female who is seen for follow-up of hypertension. She is not exercising and is not adherent to low salt diet.   Daily caffiene intake: a known amount (4-5 servings/day). Current medication regimen consists of: Ramipril 10 mg daily + Amlodipine 5 mg bid. Blood pressure is well controlled at home, ranging 120-130's/70's. BP Readings from Last 3 Encounters:   06/09/22 130/80   05/09/22 (!) 134/91   03/07/22 122/60       The patient is a 78 y.o. female who is seen for evaluation of hyperlipidemia. She was tested because hyperlipidemia w/ LDL goal < 100 + cerebrovascular disease. The current state of this condition is improved, no significant medication side effects noted and well controlled on Crestor 20 mg q hs - taking as prescribed. Last Lipid Panel:   Lab Results   Component Value Date    CHOL 144 06/03/2022    CHOL 161 11/19/2021    CHOL 181 11/12/2021     Lab Results   Component Value Date    TRIG 115 06/03/2022    TRIG 141 11/19/2021    TRIG 115 11/12/2021     Lab Results   Component Value Date    HDL 65 (H) 06/03/2022    HDL 59 11/19/2021    HDL 58 11/12/2021     Lab Results   Component Value Date    LDLCALC 56 06/03/2022    LDLCALC 73.8 11/19/2021    LDLCALC 103 (H) 11/12/2021     Lab Results   Component Value Date    LABVLDL 23 06/03/2022    LABVLDL 28.2 (H) 11/19/2021    LABVLDL 27 10/25/2019    VLDL 20 11/12/2021    VLDL 25 05/05/2021    VLDL 21 10/30/2020     Lab Results   Component Value Date    CHOLHDLRATIO 2.2 06/03/2022    CHOLHDLRATIO 2.7 11/19/2021         Review of Systems   Constitutional: Negative for diaphoresis, fatigue and unexpected weight change. Respiratory: Negative for shortness of breath. Cardiovascular: Positive for leg swelling (Bilateral ankle swelling. ). Negative for chest pain and palpitations (bilateral feet & ankles). Endocrine: Negative for cold intolerance and heat intolerance. Negative for hair loss   Musculoskeletal: Negative for myalgias. Neurological: Negative for dizziness and headaches.          /80 (Site: Left Upper Arm, Position: Sitting, Cuff Size: Large Adult)   Pulse 71 Temp 97.3 °F (36.3 °C) (Temporal)   Ht 5' 10\" (1.778 m)   Wt 169 lb (76.7 kg)   SpO2 97%   BMI 24.25 kg/m²       Objective   Physical Exam  Constitutional:       Appearance: Normal appearance. HENT:      Head: Normocephalic and atraumatic. Eyes:      Conjunctiva/sclera: Conjunctivae normal.      Pupils: Pupils are equal, round, and reactive to light. Neck:      Vascular: No carotid bruit. Cardiovascular:      Rate and Rhythm: Normal rate and regular rhythm. Heart sounds: Normal heart sounds. Pulmonary:      Effort: Pulmonary effort is normal.      Breath sounds: Normal breath sounds. Musculoskeletal:         General: Normal range of motion. Cervical back: Normal range of motion. Right lower leg: No edema. Left lower leg: No edema. Skin:     General: Skin is warm and dry. Neurological:      Mental Status: She is alert and oriented to person, place, and time. Psychiatric:         Mood and Affect: Mood normal.         Behavior: Behavior normal.         Thought Content: Thought content normal.         Judgment: Judgment normal.            On this date 6/9/2022 I have spent 25 minutes reviewing previous notes, test results and face to face with the patient discussing the diagnosis and importance of compliance with the treatment plan as well as documenting on the day of the visit. An electronic signature was used to authenticate this note.     --Carina Koch PA-C

## 2022-07-26 ENCOUNTER — OFFICE VISIT (OUTPATIENT)
Dept: CARDIOLOGY CLINIC | Age: 77
End: 2022-07-26
Payer: MEDICARE

## 2022-07-26 VITALS
HEIGHT: 70 IN | DIASTOLIC BLOOD PRESSURE: 64 MMHG | HEART RATE: 72 BPM | WEIGHT: 168 LBS | SYSTOLIC BLOOD PRESSURE: 126 MMHG | BODY MASS INDEX: 24.05 KG/M2

## 2022-07-26 DIAGNOSIS — G45.9 TIA (TRANSIENT ISCHEMIC ATTACK): Primary | ICD-10-CM

## 2022-07-26 DIAGNOSIS — E78.5 DYSLIPIDEMIA: ICD-10-CM

## 2022-07-26 DIAGNOSIS — I10 ESSENTIAL HYPERTENSION WITH GOAL BLOOD PRESSURE LESS THAN 130/85: ICD-10-CM

## 2022-07-26 PROCEDURE — 1036F TOBACCO NON-USER: CPT | Performed by: INTERNAL MEDICINE

## 2022-07-26 PROCEDURE — 3017F COLORECTAL CA SCREEN DOC REV: CPT | Performed by: INTERNAL MEDICINE

## 2022-07-26 PROCEDURE — G8399 PT W/DXA RESULTS DOCUMENT: HCPCS | Performed by: INTERNAL MEDICINE

## 2022-07-26 PROCEDURE — G8420 CALC BMI NORM PARAMETERS: HCPCS | Performed by: INTERNAL MEDICINE

## 2022-07-26 PROCEDURE — 1090F PRES/ABSN URINE INCON ASSESS: CPT | Performed by: INTERNAL MEDICINE

## 2022-07-26 PROCEDURE — 1123F ACP DISCUSS/DSCN MKR DOCD: CPT | Performed by: INTERNAL MEDICINE

## 2022-07-26 PROCEDURE — 99213 OFFICE O/P EST LOW 20 MIN: CPT | Performed by: INTERNAL MEDICINE

## 2022-07-26 PROCEDURE — G8427 DOCREV CUR MEDS BY ELIG CLIN: HCPCS | Performed by: INTERNAL MEDICINE

## 2022-07-26 ASSESSMENT — ENCOUNTER SYMPTOMS: SHORTNESS OF BREATH: 0

## 2022-07-26 NOTE — PROGRESS NOTES
[facility] CARDIOLOGY  [address], [address]  PHONE: [phone]      22    NAME:  Franky Skelton  : 1945  MRN: [id number]         SUBJECTIVE:   Franky Skelton is a 78 y.o. female seen for a follow up visit regarding the following:     Chief Complaint   Patient presents with    Follow-up Chronic Condition    Hypertension            HPI:  Follow up  Follow-up Chronic Condition and Hypertension   . Follow up prior cryptogenic TIA culminating in loop recorder implant 6 months ago, thus far unrevealing. She's felt well, has had no further palpitations. PAST CARDIAC HISTORY:    - breast cancer, lumpectomy only, arimidex   2021 - TIA manifesting with dysarthria   Brain MRI and CT no acute abnormality   MRA no large vessel or intracranial stenosis   21 - Echo EF 55-60%, impaired relaxation, negative bubble study   Dec 2021- 30 day monitor - NSR, ST, rare ectopic, 4 beats nsvt, 16 beats PAT, asymptomatic, no symptoms reported. 2022- ILR implanted             Past Medical History, Past Surgical History, Family history, Social History, and Medications were all reviewed with the patient today and updated as necessary. Prior to Admission medications    Medication Sig Start Date End Date Taking? Authorizing Provider   rosuvastatin (CRESTOR) 20 MG tablet Take 1 tablet by mouth at bedtime 22  Yes Brittany Villatoro PA-C   amitriptyline (ELAVIL) 50 MG tablet Take 50 mg by mouth nightly 21  Yes Ar Automatic Reconciliation   amLODIPine (NORVASC) 5 MG tablet Take 5 mg by mouth in the morning and 5 mg before bedtime.  3/8/22  Yes Ar Automatic Reconciliation   anastrozole (ARIMIDEX) 1 MG tablet TAKE 1 TABLET BY MOUTH EVERY DAY 21  Yes Ar Automatic Reconciliation   clopidogrel (PLAVIX) 75 MG tablet Take 75 mg by mouth daily 3/7/22  Yes Ar Automatic Reconciliation   diclofenac sodium (VOLTAREN) 1 % GEL Apply 2 g topically 3 times daily as needed 5/12/21  Yes Ar Automatic Reconciliation   furosemide (LASIX) 20 MG tablet 1/2 tablet (10 mg) daily ONLY AS NEEDED for leg swelling.  3/7/22  Yes Ar Automatic Reconciliation   ibuprofen (ADVIL;MOTRIN) 600 MG tablet Take 600 mg by mouth every 6 hours as needed 4/25/19  Yes Ar Automatic Reconciliation   levothyroxine (SYNTHROID) 75 MCG tablet Take 75 mcg by mouth every morning (before breakfast) 12/7/21  Yes Ar Automatic Reconciliation   oxybutynin (DITROPAN) 5 MG tablet Take 5 mg by mouth 2 times daily 12/7/21  Yes Ar Automatic Reconciliation   ramipril (ALTACE) 10 MG capsule Take 10 mg by mouth daily 12/7/21  Yes Ar Automatic Reconciliation     No Known Allergies  Past Medical History:   Diagnosis Date    Abnormal LFTs (liver function tests)     Breast cancer ([facility] [address] 03/04/2020    Right     Dyslipidemia     Hyperlipidemia     Hypertension     controlled with meds    Hypothyroidism     levothyroxine daily    Intraductal papilloma of right breast 12/30/2019    Menopause     @44    Urgency of urination     Varicose veins 9/17/12     Past Surgical History:   Procedure Laterality Date    BREAST BIOPSY Right 12/09/2019    2 areas    BREAST BIOPSY Right 3/4/2020    RIGHT BREAST NEEDLE LOCALIZED LUMPECTOMY/ BIOPSY X2 PREOP 0830/ LOC 1000 performed by Tammie Reese MD at [address] [facility] LUMPECTOMY Right 03/04/2020    BREAST SURGERY      cysts removed x 2 left breast- benign    COLONOSCOPY  12/10/2012 done    Drew---7-10 yr recall    DILATION AND CURETTAGE OF UTERUS      TONSILLECTOMY AND ADENOIDECTOMY  as child    TUBAL LIGATION  1978    US BREAST BIOPSY NEEDLE ADDITIONAL RIGHT Right 12/9/2019    US BREAST BIOPSY NEEDLE ADDITIONAL RIGHT 12/9/2019 [facility]    US BREAST LOCALIZATION PREOP PLACEMENT Right 03/04/2020    US BREAST LOCALIZATION PREOP PLACEMENT Right 03/04/2020    US BREAST NEEDLE BIOPSY RIGHT Right 12/9/2019    US BREAST NEEDLE BIOPSY RIGHT 12/9/2019 [facility]    US GUIDED NEEDLE LOC BREAST ADDL RIGHT Right 3/4/2020    US GUIDED NEEDLE LOC BREAST ADDL RIGHT 3/4/2020 [facility]    US GUIDED NEEDLE LOC OF RIGHT BREAST Right 3/4/2020    US GUIDED NEEDLE LOC OF RIGHT BREAST 3/4/2020 [facility]    VASCULAR SURGERY      Varicose vein removal     Family History   Problem Relation Age of Onset    Hypertension Brother     Diabetes Brother     Heart Disease Brother         mi    Breast Cancer Sister 62    Cancer Sister         breast    Heart Attack Father 50    Heart Disease Father 50        mi- massive    Breast Cancer Mother 67    Diabetes Mother     Stroke Mother     Cancer Mother         breast and rectal cancer, passed at age 80    Coronary Art Dis Brother         late 63's     Social History     Tobacco Use    Smoking status: Former     Packs/day: 0.50     Types: Cigarettes     Quit date: 1990     Years since quittin.5    Smokeless tobacco: Never   Substance Use Topics    Alcohol use: No       ROS:    Review of Systems   Cardiovascular:  Negative for chest pain and palpitations. Respiratory:  Negative for shortness of breath. PHYSICAL EXAM:   /64   Pulse 72   Ht 5' 10\" (1.778 m)   Wt 168 lb (76.2 kg)   BMI 24.11 kg/m²      Wt Readings from Last 3 Encounters:   22 168 lb (76.2 kg)   22 169 lb (76.7 kg)   22 172 lb (78 kg)     BP Readings from Last 3 Encounters:   22 126/64   22 130/80   22 (!) 134/91     Pulse Readings from Last 3 Encounters:   22 72   22 71   22 74           Physical Exam  Constitutional:       Appearance: Normal appearance. HENT:      Head: Normocephalic and atraumatic. Eyes:      Conjunctiva/sclera: Conjunctivae normal.   Neck:      Vascular: No carotid bruit. Cardiovascular:      Rate and Rhythm: Normal rate and regular rhythm. Heart sounds: No murmur heard. No friction rub. No gallop. Pulmonary:      Effort: No respiratory distress.       Breath goal blood pressure less than 130/85    Dyslipidemia        IMPRESSION:    Doing well, no arrhythmia yet documented, continue monitoring loop recorder, healthy lifestyle        Return in about 1 year (around 7/26/2023). Thank you for allowing me to participate in this patient's care. Please call or contact me if there are any questions or concerns regarding the above.       Lisa Jacobs MD  07/26/22  11:20 AM

## 2022-08-16 PROCEDURE — 93298 REM INTERROG DEV EVAL SCRMS: CPT | Performed by: INTERNAL MEDICINE

## 2022-08-16 PROCEDURE — G2066 INTER DEVC REMOTE 30D: HCPCS | Performed by: INTERNAL MEDICINE

## 2022-09-26 ENCOUNTER — TELEPHONE (OUTPATIENT)
Dept: CARDIOLOGY CLINIC | Age: 77
End: 2022-09-26

## 2022-09-26 NOTE — TELEPHONE ENCOUNTER
Aury Peter alert for 2 min AF episode, hx of TIA. On plavix. ASSESSMENT and PLAN     Jo Ann Zapata was seen today for follow-up chronic condition and hypertension. Diagnoses and all orders for this visit:     TIA (transient ischemic attack)     Essential hypertension with goal blood pressure less than 130/85     Dyslipidemia           IMPRESSION:     Doing well, no arrhythmia yet documented, continue monitoring loop recorder, healthy lifestyle           Return in about 1 year (around 7/26/2023). Thank you for allowing me to participate in this patient's care. Please call or contact me if there are any questions or concerns regarding the above.        Kiko Orozco MD  07/26/22  11:20 AM

## 2022-09-27 NOTE — TELEPHONE ENCOUNTER
Because of TIA, I agree [address] is desirable. Recommend Xarelto 20 mg daily or eliquis 5 mg BID and stop plavix if patient willing (this was the whole reason for the loop monitor, to evaluate source of TIA).

## 2022-09-27 NOTE — TELEPHONE ENCOUNTER
Patient called and below was discussed. Agreeable to d/c plavix and start eliquis 5mg bid. Bleeding precautions were discussed. Follow up appt scheduled.

## 2022-10-09 DIAGNOSIS — G45.9 TIA (TRANSIENT ISCHEMIC ATTACK): ICD-10-CM

## 2022-10-09 DIAGNOSIS — Z95.818 STATUS POST PLACEMENT OF IMPLANTABLE LOOP RECORDER: ICD-10-CM

## 2022-10-09 DIAGNOSIS — G45.9 TIA (TRANSIENT ISCHEMIC ATTACK): Primary | ICD-10-CM

## 2022-10-13 DIAGNOSIS — G45.9 TIA (TRANSIENT ISCHEMIC ATTACK): ICD-10-CM

## 2022-10-13 DIAGNOSIS — Z95.818 STATUS POST PLACEMENT OF IMPLANTABLE LOOP RECORDER: ICD-10-CM

## 2022-10-18 PROCEDURE — 93298 REM INTERROG DEV EVAL SCRMS: CPT | Performed by: INTERNAL MEDICINE

## 2022-10-18 PROCEDURE — G2066 INTER DEVC REMOTE 30D: HCPCS | Performed by: INTERNAL MEDICINE

## 2022-11-01 ENCOUNTER — OFFICE VISIT (OUTPATIENT)
Dept: CARDIOLOGY CLINIC | Age: 77
End: 2022-11-01
Payer: MEDICARE

## 2022-11-01 VITALS
HEIGHT: 70 IN | BODY MASS INDEX: 22.73 KG/M2 | HEART RATE: 68 BPM | SYSTOLIC BLOOD PRESSURE: 110 MMHG | DIASTOLIC BLOOD PRESSURE: 66 MMHG | WEIGHT: 158.8 LBS

## 2022-11-01 DIAGNOSIS — I10 ESSENTIAL HYPERTENSION: ICD-10-CM

## 2022-11-01 DIAGNOSIS — I48.0 PAROXYSMAL ATRIAL FIBRILLATION (HCC): Primary | ICD-10-CM

## 2022-11-01 DIAGNOSIS — G45.9 TIA (TRANSIENT ISCHEMIC ATTACK): ICD-10-CM

## 2022-11-01 DIAGNOSIS — E78.5 DYSLIPIDEMIA: ICD-10-CM

## 2022-11-01 PROCEDURE — G8484 FLU IMMUNIZE NO ADMIN: HCPCS | Performed by: INTERNAL MEDICINE

## 2022-11-01 PROCEDURE — G8427 DOCREV CUR MEDS BY ELIG CLIN: HCPCS | Performed by: INTERNAL MEDICINE

## 2022-11-01 PROCEDURE — 1123F ACP DISCUSS/DSCN MKR DOCD: CPT | Performed by: INTERNAL MEDICINE

## 2022-11-01 PROCEDURE — 1090F PRES/ABSN URINE INCON ASSESS: CPT | Performed by: INTERNAL MEDICINE

## 2022-11-01 PROCEDURE — 3017F COLORECTAL CA SCREEN DOC REV: CPT | Performed by: INTERNAL MEDICINE

## 2022-11-01 PROCEDURE — 99213 OFFICE O/P EST LOW 20 MIN: CPT | Performed by: INTERNAL MEDICINE

## 2022-11-01 PROCEDURE — G8399 PT W/DXA RESULTS DOCUMENT: HCPCS | Performed by: INTERNAL MEDICINE

## 2022-11-01 PROCEDURE — G8420 CALC BMI NORM PARAMETERS: HCPCS | Performed by: INTERNAL MEDICINE

## 2022-11-01 PROCEDURE — 3078F DIAST BP <80 MM HG: CPT | Performed by: INTERNAL MEDICINE

## 2022-11-01 PROCEDURE — 3074F SYST BP LT 130 MM HG: CPT | Performed by: INTERNAL MEDICINE

## 2022-11-01 PROCEDURE — 1036F TOBACCO NON-USER: CPT | Performed by: INTERNAL MEDICINE

## 2022-11-01 ASSESSMENT — ENCOUNTER SYMPTOMS: SHORTNESS OF BREATH: 0

## 2022-11-01 NOTE — PROGRESS NOTES
[facility] CARDIOLOGY  [address] [facility], [address]  [address] [facility], [address] [place] [address]  PHONE: [phone]      22    NAME:  Mikhail Gifford  : 1945  MRN: [id number]         SUBJECTIVE:   Mikhail Gifford is a 78 y.o. female seen for a follow up visit regarding the following:     Chief Complaint   Patient presents with    Hypertension            HPI:  Follow up  Hypertension   . Follow up prior cryptogenic TIA culminating in loop recorder implant having now demonstrated afib about 6 months after implant. Started Eliquis and here today to discuss. Plavix was stopped in favor of eliquis on which she is doing well. No palpitations, cp or bleeding issues             PAST CARDIAC HISTORY:    - breast cancer, lumpectomy only, arimidex   2021 - TIA manifesting with dysarthria   Brain MRI and CT no acute abnormality   MRA no large vessel or intracranial stenosis   21 - Echo EF 55-60%, impaired relaxation, negative bubble study   Dec 2021- 30 day monitor - NSR, ST, rare ectopic, 4 beats nsvt, 16 beats PAT, asymptomatic, no symptoms reported. 2022- ILR implanted          Key CAD CHF Meds            apixaban (ELIQUIS) 5 MG TABS tablet (Taking)    Sig - Route: Take 1 tablet by mouth 2 times daily - Oral    Class: Print    rosuvastatin (CRESTOR) 20 MG tablet (Taking)    Sig - Route: Take 1 tablet by mouth at bedtime - Oral    Class: Print    amLODIPine (NORVASC) 5 MG tablet (Taking)    Class: Historical Med    furosemide (LASIX) 20 MG tablet (Taking)    Class: Historical Med    ramipril (ALTACE) 10 MG capsule (Taking)    Class: Historical Med              Past Medical History, Past Surgical History, Family history, Social History, and Medications were all reviewed with the patient today and updated as necessary. Prior to Admission medications    Medication Sig Start Date End Date Taking?  Authorizing Provider   apixaban (ELIQUIS) 5 MG TABS tablet Take 1 tablet by mouth 2 times daily 11/1/22  Yes Dougie Perez MD   rosuvastatin (CRESTOR) 20 MG tablet Take 1 tablet by mouth at bedtime 6/9/22  Yes Brittany Villatoro PA-C   amitriptyline (ELAVIL) 50 MG tablet Take 50 mg by mouth nightly 12/7/21  Yes Ar Automatic Reconciliation   amLODIPine (NORVASC) 5 MG tablet Take 5 mg by mouth in the morning and 5 mg before bedtime. 3/8/22  Yes Ar Automatic Reconciliation   anastrozole (ARIMIDEX) 1 MG tablet TAKE 1 TABLET BY MOUTH EVERY DAY 4/12/21  Yes Ar Automatic Reconciliation   diclofenac sodium (VOLTAREN) 1 % GEL Apply 2 g topically 3 times daily as needed 5/12/21  Yes Ar Automatic Reconciliation   furosemide (LASIX) 20 MG tablet 1/2 tablet (10 mg) daily ONLY AS NEEDED for leg swelling.  3/7/22  Yes Ar Automatic Reconciliation   ibuprofen (ADVIL;MOTRIN) 600 MG tablet Take 600 mg by mouth every 6 hours as needed 4/25/19  Yes Ar Automatic Reconciliation   levothyroxine (SYNTHROID) 75 MCG tablet Take 75 mcg by mouth every morning (before breakfast) 12/7/21  Yes Ar Automatic Reconciliation   oxybutynin (DITROPAN) 5 MG tablet Take 5 mg by mouth 2 times daily 12/7/21  Yes Ar Automatic Reconciliation   ramipril (ALTACE) 10 MG capsule Take 10 mg by mouth daily 12/7/21  Yes Ar Automatic Reconciliation     No Known Allergies  Past Medical History:   Diagnosis Date    Abnormal LFTs (liver function tests)     Breast cancer ([facility] [address] 03/04/2020    Right     Dyslipidemia     Hyperlipidemia     Hypertension     controlled with meds    Hypothyroidism     levothyroxine daily    Intraductal papilloma of right breast 12/30/2019    Menopause     @44    Urgency of urination     Varicose veins 9/17/12     Past Surgical History:   Procedure Laterality Date    BREAST BIOPSY Right 12/09/2019    2 areas    BREAST BIOPSY Right 3/4/2020    RIGHT BREAST NEEDLE LOCALIZED LUMPECTOMY/ BIOPSY X2 PREOP 0830/ LOC 1000 performed by Nikita Castro MD at [address] [facility] LUMPECTOMY Right 03/04/2020    BREAST SURGERY      cysts removed x 2 left breast- benign    COLONOSCOPY  12/10/2012 done    Drew---7-10 yr recall    DILATION AND CURETTAGE OF UTERUS      TONSILLECTOMY AND ADENOIDECTOMY  as child    TUBAL LIGATION      US BREAST BIOPSY NEEDLE ADDITIONAL RIGHT Right 2019    US BREAST BIOPSY NEEDLE ADDITIONAL RIGHT 2019 [facility]    US BREAST LOCALIZATION PREOP PLACEMENT Right 2020    US BREAST LOCALIZATION PREOP PLACEMENT Right 2020    US BREAST NEEDLE BIOPSY RIGHT Right 2019    US BREAST NEEDLE BIOPSY RIGHT 2019 [facility]    US GUIDED NEEDLE LOC BREAST ADDL RIGHT Right 3/4/2020    US GUIDED NEEDLE LOC BREAST ADDL RIGHT 3/4/2020 [facility]    US GUIDED NEEDLE LOC OF RIGHT BREAST Right 3/4/2020    US GUIDED NEEDLE LOC OF RIGHT BREAST 3/4/2020 [facility]    VASCULAR SURGERY      Varicose vein removal     Family History   Problem Relation Age of Onset    Hypertension Brother     Diabetes Brother     Heart Disease Brother         mi    Breast Cancer Sister 62    Cancer Sister         breast    Heart Attack Father 50    Heart Disease Father 50        mi- massive    Breast Cancer Mother 67    Diabetes Mother     Stroke Mother     Cancer Mother         breast and rectal cancer, passed at age 80    Coronary Art Dis Brother         late 63's     Social History     Tobacco Use    Smoking status: Former     Packs/day: 0.50     Types: Cigarettes     Quit date: 1990     Years since quittin.8    Smokeless tobacco: Never   Substance Use Topics    Alcohol use: No       ROS:    Review of Systems   Cardiovascular:  Negative for chest pain. Respiratory:  Negative for shortness of breath. Hematologic/Lymphatic: Does not bruise/bleed easily.         PHYSICAL EXAM:   /66   Pulse 68   Ht 5' 10\" (1.778 m)   Wt 158 lb 12.8 oz (72 kg)   BMI 22.79 kg/m²      Wt Readings from Last 3 Encounters:   22 158 lb 12.8 oz (72 kg)   22 168 lb (76.2 kg)   22 169 lb (76.7 kg)     BP Readings from Last 3 Encounters:   11/01/22 110/66   07/26/22 126/64   06/09/22 130/80     Pulse Readings from Last 3 Encounters:   11/01/22 68   07/26/22 72   06/09/22 71           Physical Exam  Constitutional:       Appearance: Normal appearance. HENT:      Head: Normocephalic and atraumatic. Eyes:      Conjunctiva/sclera: Conjunctivae normal.   Neck:      Vascular: No carotid bruit. Cardiovascular:      Rate and Rhythm: Normal rate and regular rhythm. Heart sounds: No murmur heard. No friction rub. No gallop. Pulmonary:      Effort: No respiratory distress. Breath sounds: No wheezing or rales. Musculoskeletal:         General: No swelling. Cervical back: Neck supple. Skin:     General: Skin is warm and dry. Neurological:      General: No focal deficit present. Mental Status: She is alert. Psychiatric:         Mood and Affect: Mood normal.         Behavior: Behavior normal.       Medical problems and test results were reviewed with the patient today.      DATA REVIEW    LIPID PANEL     Lab Results   Component Value Date    CHOL 144 06/03/2022    CHOL 161 11/19/2021    CHOL 181 11/12/2021     Lab Results   Component Value Date    TRIG 115 06/03/2022    TRIG 141 11/19/2021    TRIG 115 11/12/2021     Lab Results   Component Value Date    HDL 65 (H) 06/03/2022    HDL 59 11/19/2021    HDL 58 11/12/2021     Lab Results   Component Value Date    LDLCALC 56 06/03/2022    LDLCALC 73.8 11/19/2021    LDLCALC 103 (H) 11/12/2021     Lab Results   Component Value Date    LABVLDL 23 06/03/2022    LABVLDL 28.2 (H) 11/19/2021    LABVLDL 27 10/25/2019    VLDL 20 11/12/2021    VLDL 25 05/05/2021    VLDL 21 10/30/2020     Lab Results   Component Value Date    CHOLHDLRATIO 2.2 06/03/2022    CHOLHDLRATIO 2.7 11/19/2021       BMP  Lab Results   Component Value Date/Time     06/03/2022 04:05 PM    K 4.2 06/03/2022 04:05 PM     06/03/2022 04:05 PM    CO2 26 06/03/2022 04:05 PM    BUN 11 06/03/2022 04:05 PM    CREATININE 0.90 06/03/2022 04:05 PM    GLUCOSE 84 06/03/2022 04:05 PM    CALCIUM 9.9 06/03/2022 04:05 PM          EKG        CXR/IMAGING        DEVICE INTERROGATION        OUTSIDE RECORDS REVIEW    Records from outside providers have been reviewed and summarized as noted in the HPI, past history and data review sections of this note, and reviewed with patient. .       ASSESSMENT and PLAN    Tessa Garay was seen today for hypertension. Diagnoses and all orders for this visit:    Paroxysmal atrial fibrillation (HCC)    TIA (transient ischemic attack)    Essential hypertension    Dyslipidemia    Other orders  -     apixaban (ELIQUIS) 5 MG TABS tablet; Take 1 tablet by mouth 2 times daily        IMPRESSION:     Rate controlled, anticoagulated, continue meds  Reviewed pathophysiology    BP at target, continue meds      Lipids at goal, continue meds          Return in about 1 year (around 11/1/2023). Thank you for allowing me to participate in this patient's care. Please call or contact me if there are any questions or concerns regarding the above.       Nolan Calixto MD  11/01/22  1:20 PM

## 2022-11-01 NOTE — PATIENT INSTRUCTIONS
Patient Education        Atrial Fibrillation: Care Instructions  Your Care Instructions     Atrial fibrillation is an irregular and often fast heartbeat. Treating this condition is important for several reasons. It can cause blood clots, which can travel from your heart to your brain and cause a stroke. If you have a fast heartbeat, you may feel lightheaded, dizzy, and weak. An irregular heartbeat can also increase your risk for heart failure. Atrial fibrillation is often the result of another heart condition, such as high blood pressure or coronary artery disease. Making changes to improve your heart condition will help you stay healthy and active. Follow-up care is a key part of your treatment and safety. Be sure to make and go to all appointments, and call your doctor if you are having problems. It's also a good idea to know your test results and keep a list of the medicines you take. How can you care for yourself at home? Medicines    Take your medicines exactly as prescribed. Call your doctor if you think you are having a problem with your medicine. You will get more details on the specific medicines your doctor prescribes. If your doctor has given you a blood thinner to prevent a stroke, be sure you get instructions about how to take your medicine safely. Blood thinners can cause serious bleeding problems. Do not take any vitamins, over-the-counter drugs, or herbal products without talking to your doctor first.   Lifestyle changes    Do not smoke. Smoking can increase your chance of a stroke and heart attack. If you need help quitting, talk to your doctor about stop-smoking programs and medicines. These can increase your chances of quitting for good. Eat a heart-healthy diet. Stay at a healthy weight. Lose weight if you need to. Limit alcohol to 2 drinks a day for men and 1 drink a day for women. Too much alcohol can cause health problems. Avoid colds and flu.  Get a pneumococcal vaccine shot. If you have had one before, ask your doctor whether you need another dose. Get a flu shot every year. If you must be around people with colds or flu, wash your hands often. Activity    If your doctor recommends it, get more exercise. Walking is a good choice. Bit by bit, increase the amount you walk every day. Try for at least 30 minutes on most days of the week. You also may want to swim, bike, or do other activities. Your doctor may suggest that you join a cardiac rehabilitation program so that you can have help increasing your physical activity safely. Start light exercise if your doctor says it is okay. Even a small amount will help you get stronger, have more energy, and manage stress. Walking is an easy way to get exercise. Start out by walking a little more than you did in the hospital. Gradually increase the amount you walk. When you exercise, watch for signs that your heart is working too hard. You are pushing too hard if you cannot talk while you are exercising. If you become short of breath or dizzy or have chest pain, sit down and rest immediately. Check your pulse regularly. Place two fingers on the artery at the palm side of your wrist, in line with your thumb. If your heartbeat seems uneven or fast, talk to your doctor. When should you call for help? Call 911 anytime you think you may need emergency care. For example, call if:    You have symptoms of a heart attack. These may include:  Chest pain or pressure, or a strange feeling in the chest.  Sweating. Shortness of breath. Nausea or vomiting. Pain, pressure, or a strange feeling in the back, neck, jaw, or upper belly or in one or both shoulders or arms. Lightheadedness or sudden weakness. A fast or irregular heartbeat. After you call 911, the  may tell you to chew 1 adult-strength or 2 to 4 low-dose aspirin. Wait for an ambulance. Do not try to drive yourself. You have symptoms of a stroke.  These may include:  Sudden numbness, tingling, weakness, or loss of movement in your face, arm, or leg, especially on only one side of your body. Sudden vision changes. Sudden trouble speaking. Sudden confusion or trouble understanding simple statements. Sudden problems with walking or balance. A sudden, severe headache that is different from past headaches. You passed out (lost consciousness). Call your doctor now or seek immediate medical care if:    You have new or increased shortness of breath. You feel dizzy or lightheaded, or you feel like you may faint. Your heart rate becomes irregular. You can feel your heart flutter in your chest or skip heartbeats. Tell your doctor if these symptoms are new or worse. Watch closely for changes in your health, and be sure to contact your doctor if you have any problems. Where can you learn more? Go to [email or web address]://[email or web address] River [email or web address] org and sign in to your Reach[employer] account. Enter U020 in the ThousandEyes box to learn more about \"Atrial Fibrillation: Care Instructions. \"     If you do not have an account, please click on the \"Sign Up Now\" link. Current as of: January 10, 2022               Content Version: 13.4  © 0377-3297 Healthwise, [employer]. Care instructions adapted under license by [facility] [employer] [facility] ([facility]). If you have questions about a medical condition or this instruction, always ask your healthcare professional. [address] any warranty or liability for your use of this information. Please visit [email or web address]. org for more information regarding cardiovascular disease prevention and treatment.

## 2022-11-14 PROCEDURE — G2066 INTER DEVC REMOTE 30D: HCPCS | Performed by: INTERNAL MEDICINE

## 2022-11-14 PROCEDURE — 93298 REM INTERROG DEV EVAL SCRMS: CPT | Performed by: INTERNAL MEDICINE

## 2022-11-16 DIAGNOSIS — G45.9 TIA (TRANSIENT ISCHEMIC ATTACK): ICD-10-CM

## 2022-11-16 DIAGNOSIS — Z95.818 STATUS POST PLACEMENT OF IMPLANTABLE LOOP RECORDER: ICD-10-CM

## 2022-12-05 DIAGNOSIS — E78.5 DYSLIPIDEMIA: ICD-10-CM

## 2022-12-05 DIAGNOSIS — E03.9 HYPOTHYROIDISM, UNSPECIFIED TYPE: ICD-10-CM

## 2022-12-05 DIAGNOSIS — I10 ESSENTIAL HYPERTENSION WITH GOAL BLOOD PRESSURE LESS THAN 130/85: ICD-10-CM

## 2022-12-05 LAB
ALBUMIN SERPL-MCNC: 4 G/DL (ref 3.2–4.6)
ALBUMIN/GLOB SERPL: 1.5 {RATIO} (ref 0.4–1.6)
ALP SERPL-CCNC: 99 U/L (ref 50–136)
ALT SERPL-CCNC: 36 U/L (ref 12–65)
ANION GAP SERPL CALC-SCNC: 7 MMOL/L (ref 2–11)
AST SERPL-CCNC: 19 U/L (ref 15–37)
BASOPHILS # BLD: 0 K/UL (ref 0–0.2)
BASOPHILS NFR BLD: 0 % (ref 0–2)
BILIRUB SERPL-MCNC: 0.9 MG/DL (ref 0.2–1.1)
BUN SERPL-MCNC: 15 MG/DL (ref 8–23)
CALCIUM SERPL-MCNC: 9.8 MG/DL (ref 8.3–10.4)
CHLORIDE SERPL-SCNC: 108 MMOL/L (ref 101–110)
CHOLEST SERPL-MCNC: 166 MG/DL
CO2 SERPL-SCNC: 28 MMOL/L (ref 21–32)
CREAT SERPL-MCNC: 0.9 MG/DL (ref 0.6–1)
DIFFERENTIAL METHOD BLD: NORMAL
EOSINOPHIL # BLD: 0.4 K/UL (ref 0–0.8)
EOSINOPHIL NFR BLD: 6 % (ref 0.5–7.8)
ERYTHROCYTE [DISTWIDTH] IN BLOOD BY AUTOMATED COUNT: 13.9 % (ref 11.9–14.6)
GLOBULIN SER CALC-MCNC: 2.7 G/DL (ref 2.8–4.5)
GLUCOSE SERPL-MCNC: 97 MG/DL (ref 65–100)
HCT VFR BLD AUTO: 44.4 % (ref 35.8–46.3)
HDLC SERPL-MCNC: 69 MG/DL (ref 40–60)
HDLC SERPL: 2.4 {RATIO}
HGB BLD-MCNC: 14.3 G/DL (ref 11.7–15.4)
IMM GRANULOCYTES # BLD AUTO: 0 K/UL (ref 0–0.5)
IMM GRANULOCYTES NFR BLD AUTO: 0 % (ref 0–5)
LDLC SERPL CALC-MCNC: 71.2 MG/DL
LYMPHOCYTES # BLD: 2.4 K/UL (ref 0.5–4.6)
LYMPHOCYTES NFR BLD: 34 % (ref 13–44)
MCH RBC QN AUTO: 29.4 PG (ref 26.1–32.9)
MCHC RBC AUTO-ENTMCNC: 32.2 G/DL (ref 31.4–35)
MCV RBC AUTO: 91.4 FL (ref 82–102)
MONOCYTES # BLD: 0.5 K/UL (ref 0.1–1.3)
MONOCYTES NFR BLD: 7 % (ref 4–12)
NEUTS SEG # BLD: 3.9 K/UL (ref 1.7–8.2)
NEUTS SEG NFR BLD: 53 % (ref 43–78)
NRBC # BLD: 0 K/UL (ref 0–0.2)
PLATELET # BLD AUTO: 249 K/UL (ref 150–450)
PMV BLD AUTO: 9.7 FL (ref 9.4–12.3)
POTASSIUM SERPL-SCNC: 4.3 MMOL/L (ref 3.5–5.1)
PROT SERPL-MCNC: 6.7 G/DL (ref 6.3–8.2)
RBC # BLD AUTO: 4.86 M/UL (ref 4.05–5.2)
SODIUM SERPL-SCNC: 143 MMOL/L (ref 133–143)
TRIGL SERPL-MCNC: 129 MG/DL (ref 35–150)
TSH, 3RD GENERATION: 2.25 UIU/ML (ref 0.36–3.74)
VLDLC SERPL CALC-MCNC: 25.8 MG/DL (ref 6–23)
WBC # BLD AUTO: 7.2 K/UL (ref 4.3–11.1)

## 2022-12-06 LAB
APPEARANCE UR: CLEAR
BACTERIA URNS QL MICRO: NEGATIVE /HPF
BILIRUB UR QL: NEGATIVE
CASTS URNS QL MICRO: ABNORMAL /LPF (ref 0–2)
COLOR UR: ABNORMAL
EPI CELLS #/AREA URNS HPF: ABNORMAL /HPF (ref 0–5)
GLUCOSE UR STRIP.AUTO-MCNC: NEGATIVE MG/DL
HGB UR QL STRIP: ABNORMAL
KETONES UR QL STRIP.AUTO: NEGATIVE MG/DL
LEUKOCYTE ESTERASE UR QL STRIP.AUTO: NEGATIVE
MUCOUS THREADS URNS QL MICRO: 0 /LPF
NITRITE UR QL STRIP.AUTO: NEGATIVE
PH UR STRIP: 7 [PH] (ref 5–9)
PROT UR STRIP-MCNC: NEGATIVE MG/DL
RBC #/AREA URNS HPF: ABNORMAL /HPF (ref 0–5)
SP GR UR REFRACTOMETRY: 1.01 (ref 1–1.02)
URINE CULTURE IF INDICATED: ABNORMAL
UROBILINOGEN UR QL STRIP.AUTO: 0.2 EU/DL (ref 0.2–1)
WBC URNS QL MICRO: ABNORMAL /HPF (ref 0–4)

## 2022-12-12 ENCOUNTER — OFFICE VISIT (OUTPATIENT)
Dept: INTERNAL MEDICINE CLINIC | Facility: CLINIC | Age: 77
End: 2022-12-12
Payer: MEDICARE

## 2022-12-12 VITALS
HEART RATE: 76 BPM | TEMPERATURE: 97.2 F | WEIGHT: 169 LBS | OXYGEN SATURATION: 99 % | BODY MASS INDEX: 24.2 KG/M2 | SYSTOLIC BLOOD PRESSURE: 130 MMHG | DIASTOLIC BLOOD PRESSURE: 72 MMHG | HEIGHT: 70 IN

## 2022-12-12 DIAGNOSIS — Z00.00 MEDICARE ANNUAL WELLNESS VISIT, SUBSEQUENT: Primary | ICD-10-CM

## 2022-12-12 DIAGNOSIS — K12.1 MOUTH ULCER: ICD-10-CM

## 2022-12-12 DIAGNOSIS — I10 PRIMARY HYPERTENSION: ICD-10-CM

## 2022-12-12 DIAGNOSIS — E03.9 ACQUIRED HYPOTHYROIDISM: ICD-10-CM

## 2022-12-12 DIAGNOSIS — I48.0 PAROXYSMAL ATRIAL FIBRILLATION (HCC): ICD-10-CM

## 2022-12-12 DIAGNOSIS — D05.11 DUCTAL CARCINOMA IN SITU (DCIS) OF RIGHT BREAST: ICD-10-CM

## 2022-12-12 DIAGNOSIS — Z11.59 NEED FOR HEPATITIS C SCREENING TEST: ICD-10-CM

## 2022-12-12 DIAGNOSIS — Z12.11 COLON CANCER SCREENING: ICD-10-CM

## 2022-12-12 PROBLEM — R47.1 DYSARTHRIA: Status: RESOLVED | Noted: 2021-11-18 | Resolved: 2022-12-12

## 2022-12-12 PROCEDURE — 1090F PRES/ABSN URINE INCON ASSESS: CPT | Performed by: INTERNAL MEDICINE

## 2022-12-12 PROCEDURE — G0439 PPPS, SUBSEQ VISIT: HCPCS | Performed by: INTERNAL MEDICINE

## 2022-12-12 PROCEDURE — G8484 FLU IMMUNIZE NO ADMIN: HCPCS | Performed by: INTERNAL MEDICINE

## 2022-12-12 PROCEDURE — 1036F TOBACCO NON-USER: CPT | Performed by: INTERNAL MEDICINE

## 2022-12-12 PROCEDURE — 3078F DIAST BP <80 MM HG: CPT | Performed by: INTERNAL MEDICINE

## 2022-12-12 PROCEDURE — 3017F COLORECTAL CA SCREEN DOC REV: CPT | Performed by: INTERNAL MEDICINE

## 2022-12-12 PROCEDURE — G8399 PT W/DXA RESULTS DOCUMENT: HCPCS | Performed by: INTERNAL MEDICINE

## 2022-12-12 PROCEDURE — 3074F SYST BP LT 130 MM HG: CPT | Performed by: INTERNAL MEDICINE

## 2022-12-12 PROCEDURE — 99214 OFFICE O/P EST MOD 30 MIN: CPT | Performed by: INTERNAL MEDICINE

## 2022-12-12 PROCEDURE — G8420 CALC BMI NORM PARAMETERS: HCPCS | Performed by: INTERNAL MEDICINE

## 2022-12-12 PROCEDURE — G8427 DOCREV CUR MEDS BY ELIG CLIN: HCPCS | Performed by: INTERNAL MEDICINE

## 2022-12-12 PROCEDURE — 1123F ACP DISCUSS/DSCN MKR DOCD: CPT | Performed by: INTERNAL MEDICINE

## 2022-12-12 RX ORDER — ANASTROZOLE 1 MG/1
1 TABLET ORAL DAILY
Qty: 90 TABLET | Refills: 3 | Status: SHIPPED | OUTPATIENT
Start: 2022-12-12

## 2022-12-12 RX ORDER — RAMIPRIL 10 MG/1
10 CAPSULE ORAL DAILY
Qty: 90 CAPSULE | Refills: 3 | Status: SHIPPED | OUTPATIENT
Start: 2022-12-12

## 2022-12-12 RX ORDER — OXYBUTYNIN CHLORIDE 5 MG/1
5 TABLET ORAL 2 TIMES DAILY
Qty: 180 TABLET | Refills: 3 | Status: SHIPPED | OUTPATIENT
Start: 2022-12-12

## 2022-12-12 RX ORDER — VALACYCLOVIR HYDROCHLORIDE 500 MG/1
500 TABLET, FILM COATED ORAL 2 TIMES DAILY
Qty: 60 TABLET | Refills: 0 | Status: SHIPPED | OUTPATIENT
Start: 2022-12-12 | End: 2023-01-11

## 2022-12-12 RX ORDER — LEVOTHYROXINE SODIUM 0.07 MG/1
75 TABLET ORAL
Qty: 90 TABLET | Refills: 3 | Status: SHIPPED | OUTPATIENT
Start: 2022-12-12

## 2022-12-12 RX ORDER — AMITRIPTYLINE HYDROCHLORIDE 50 MG/1
50 TABLET, FILM COATED ORAL NIGHTLY
Qty: 90 TABLET | Refills: 3 | Status: SHIPPED | OUTPATIENT
Start: 2022-12-12

## 2022-12-12 RX ORDER — AMLODIPINE BESYLATE 5 MG/1
5 TABLET ORAL 2 TIMES DAILY
Qty: 180 TABLET | Refills: 3 | Status: SHIPPED | OUTPATIENT
Start: 2022-12-12

## 2022-12-12 ASSESSMENT — PATIENT HEALTH QUESTIONNAIRE - PHQ9
SUM OF ALL RESPONSES TO PHQ QUESTIONS 1-9: 0
2. FEELING DOWN, DEPRESSED OR HOPELESS: 0
SUM OF ALL RESPONSES TO PHQ QUESTIONS 1-9: 0
SUM OF ALL RESPONSES TO PHQ9 QUESTIONS 1 & 2: 0
1. LITTLE INTEREST OR PLEASURE IN DOING THINGS: 0

## 2022-12-12 ASSESSMENT — ENCOUNTER SYMPTOMS
CHEST TIGHTNESS: 0
SHORTNESS OF BREATH: 0
ANAL BLEEDING: 0
BLOOD IN STOOL: 0

## 2022-12-12 ASSESSMENT — LIFESTYLE VARIABLES
HOW OFTEN DO YOU HAVE A DRINK CONTAINING ALCOHOL: MONTHLY OR LESS
HOW MANY STANDARD DRINKS CONTAINING ALCOHOL DO YOU HAVE ON A TYPICAL DAY: 1 OR 2

## 2022-12-12 NOTE — PROGRESS NOTES
Medicare Annual Wellness Visit    Bobby Kaye is here for Medicare AW and Discuss Labs    Assessment & Plan   1. Medicare annual wellness visit, subsequent  Completed. 2. Colon cancer screening  Colonoscopy due. 10 yr follow up. Last completed 12/10/12  - [facility]- Colonoscopy    3. Need for hepatitis C screening test  The USPSTF recommends screening for hepatitis C virus (HCV) infection in persons at high risk for infection. The USPSTF also recommends offering 1-time screening for HCV infection to adults born between [facility].    - Hepatitis C Antibody; Future    4. Mouth ulcer  See below.   - valACYclovir (VALTREX) 500 MG tablet; Take 1 tablet by mouth 2 times daily  Dispense: 60 tablet; Refill: 0    5. Paroxysmal atrial fibrillation (HCC)  Treatment regimen is effective. - Comprehensive Metabolic Panel; Future  - CBC; Future  - Lipid Panel; Future  - TSH; Future    6. Ductal carcinoma in situ (DCIS) of right breast  Per oncology    7. Primary hypertension  Treatment regimen is effective. - Comprehensive Metabolic Panel; Future  - CBC; Future  - Lipid Panel; Future  - TSH; Future    8. Acquired hypothyroidism  Treatment regimen is effective. - TSH; Future    On this date, 12/12/22, I have spent 39 minutes reviewing previous notes, test results and face to face with the patient discussing the diagnosis and importance of compliance with the treatment plan as well as documenting on the day of the visit. An electronic signature was used to authenticate this note. -- Sneha Florence MD     Medicare annual wellness visit, subsequent  Colon cancer screening  -     [facility]- Colonoscopy  Need for hepatitis C screening test  -     Hepatitis C Antibody; Future  Mouth ulcer  Comments:  Salt water rinse, Antiseptic rinse, Valtrex if pain develops. Prob viral mouth ulcer  Orders:  -     valACYclovir (VALTREX) 500 MG tablet;  Take 1 tablet by mouth 2 times daily, Disp-60 tablet, R-0Print  Paroxysmal atrial fibrillation (HCC)  Assessment & Plan:   Asymptomatic, continue current medications  Orders:  -     Comprehensive Metabolic Panel; Future  -     CBC; Future  -     Lipid Panel; Future  -     TSH; Future  Ductal carcinoma in situ (DCIS) of right breast  Assessment & Plan:   Monitored by specialist- no acute findings meriting change in the plan  Primary hypertension  -     Comprehensive Metabolic Panel; Future  -     CBC; Future  -     Lipid Panel; Future  -     TSH; Future  Acquired hypothyroidism  -     TSH; Future      Recommendations for Preventive Services Due: see orders and patient instructions/AVS.  Recommended screening schedule for the next 5-10 years is provided to the patient in written form: see Patient Instructions/AVS.     Return in 6 months (on 6/12/2023) for Medicare Annual Wellness Visit in 1 year. Subjective   The following acute and/or chronic problems were also addressed today:      The patient is a 78 y. o.female who is seen for follow-up of HTN, Hypothyroidism, HLD, pAfib, Hx of breast cancer. She is exercising and is adherent to low salt diet. Blood pressure is well controlled at home. Cardiac symptoms: lower extremity edema. Patient denies chest pain, chest pressure/discomfort, dyspnea, palpitations, irregular heart beats, near-syncope, syncope, paroxysmal nocturnal dyspnea, exertional chest pressure/discomfort, claudication, dyspnea on exertion, dizziness. Cardiovascular risk factors: smoking/ tobacco exposure, dyslipidemia, hypertension, stress, post-menopausal, breast cancer history and anti-estrogen. Loop recorder 9/26/22 alert for AF 2 min. Transitioned from Plavix to Eliquis. No neg SE. No bleeding. Review of Systems   Constitutional:  Negative for activity change and fatigue. HENT:          Mouth ulcer few days on roof of mouth. No pain. Eyes:  Negative for visual disturbance. Respiratory:  Negative for chest tightness and shortness of breath. Cardiovascular:  Negative for chest pain, palpitations and leg swelling. Gastrointestinal:  Negative for anal bleeding and blood in stool. Endocrine: Negative for polydipsia and polyuria. Genitourinary:  Negative for dysuria and hematuria. Musculoskeletal:  Negative for myalgias. Skin:  Negative for wound. Neurological:  Negative for headaches. Psychiatric/Behavioral:  Negative for dysphoric mood. Health Maintenance   Topic Date Due    Hepatitis C screen  Never done    COVID-19 Vaccine (5 - Booster for Pfizer series) 12/02/2022    Colorectal Cancer Screen  12/10/2022    Depression Screen  06/09/2023    Lipids  12/05/2023    Annual Wellness Visit (AWV)  12/13/2023    DTaP/Tdap/Td vaccine (2 - Td or Tdap) 11/05/2030    DEXA (modify frequency per FRAX score)  Completed    Flu vaccine  Completed    Shingles vaccine  Completed    Pneumococcal 65+ years Vaccine  Completed    Hepatitis A vaccine  Aged Out    Hib vaccine  Aged Out    Meningococcal (ACWY) vaccine  Aged Fatuma Madison reviewed and discussed and medication refilled as needed for chronic medications during ov or adjusted based on lab results and/or our discussion as appropriate. See discussion. Patient's complete Health Risk Assessment and screening values have been reviewed and are found in Flowsheets. The following problems were reviewed today and where indicated follow up appointments were made and/or referrals ordered. Positive Risk Factor Screenings with Interventions:                    Vision Screen:  Do you have difficulty driving, watching TV, or doing any of your daily activities because of your eyesight?: No  Have you had an eye exam within the past year?: (!) No  No results found.     Interventions:   See AVS for additional education material  See A/P for any pertinent orders                      Objective   Vitals:    12/12/22 1122   BP: 130/72 Site: Left Upper Arm   Position: Sitting   Cuff Size: Small Adult   Pulse: 76   Temp: 97.2 °F (36.2 °C)   TempSrc: Skin   SpO2: 99%   Weight: 169 lb (76.7 kg)   Height: 5' 10\" (1.778 m)      Body mass index is 24.25 kg/m². Physical Exam  Vitals and nursing note reviewed. Constitutional:       Appearance: Normal appearance. She is not ill-appearing. HENT:      Head: Normocephalic and atraumatic. Mouth/Throat:      Comments: Small mouth ulcer roof of mouth on tarus tub  Eyes:      Extraocular Movements: Extraocular movements intact. Conjunctiva/sclera: Conjunctivae normal.   Cardiovascular:      Rate and Rhythm: Normal rate and regular rhythm. Heart sounds: Normal heart sounds. No murmur heard. No friction rub. No gallop. Pulmonary:      Effort: Pulmonary effort is normal.      Breath sounds: Normal breath sounds. No wheezing, rhonchi or rales. Musculoskeletal:      Right lower leg: No edema. Left lower leg: No edema. Neurological:      General: No focal deficit present. Mental Status: She is alert and oriented to person, place, and time. Mental status is at baseline.    Psychiatric:         Mood and Affect: Mood normal.         Behavior: Behavior normal.     Results for orders placed or performed in visit on 12/05/22 (from the past 2160 hour(s))   Lipid Panel   Result Value Ref Range    Cholesterol, Total 166 <200 MG/DL    Triglycerides 129 35 - 150 MG/DL    HDL 69 (H) 40 - 60 MG/DL    LDL Calculated 71.2 <100 MG/DL    VLDL Cholesterol Calculated 25.8 (H) 6.0 - 23.0 MG/DL    Chol/HDL Ratio 2.4     TSH   Result Value Ref Range    TSH, 3RD GENERATION 2.250 0.358 - 3.740 uIU/mL   Comprehensive Metabolic Panel   Result Value Ref Range    Sodium 143 133 - 143 mmol/L    Potassium 4.3 3.5 - 5.1 mmol/L    Chloride 108 101 - 110 mmol/L    CO2 28 21 - 32 mmol/L    Anion Gap 7 2 - 11 mmol/L    Glucose 97 65 - 100 mg/dL    BUN 15 8 - 23 MG/DL    Creatinine 0.90 0.6 - 1.0 MG/DL Est, Glom Filt Rate >60 >60 ml/min/1.73m2    Calcium 9.8 8.3 - 10.4 MG/DL    Total Bilirubin 0.9 0.2 - 1.1 MG/DL    ALT 36 12 - 65 U/L    AST 19 15 - 37 U/L    Alk Phosphatase 99 50 - 136 U/L    Total Protein 6.7 6.3 - 8.2 g/dL    Albumin 4.0 3.2 - 4.6 g/dL    Globulin 2.7 (L) 2.8 - 4.5 g/dL    Albumin/Globulin Ratio 1.5 0.4 - 1.6     CBC with Auto Differential   Result Value Ref Range    WBC 7.2 4.3 - 11.1 K/uL    RBC 4.86 4.05 - 5.2 M/uL    Hemoglobin 14.3 11.7 - 15.4 g/dL    Hematocrit 44.4 35.8 - 46.3 %    MCV 91.4 82 - 102 FL    MCH 29.4 26.1 - 32.9 PG    MCHC 32.2 31.4 - 35.0 g/dL    RDW 13.9 11.9 - 14.6 %    Platelets 455 507 - 414 K/uL    MPV 9.7 9.4 - 12.3 FL    nRBC 0.00 0.0 - 0.2 K/uL    Differential Type AUTOMATED      Seg Neutrophils 53 43 - 78 %    Lymphocytes 34 13 - 44 %    Monocytes 7 4.0 - 12.0 %    Eosinophils % 6 0.5 - 7.8 %    Basophils 0 0.0 - 2.0 %    Immature Granulocytes 0 0.0 - 5.0 %    Segs Absolute 3.9 1.7 - 8.2 K/UL    Absolute Lymph # 2.4 0.5 - 4.6 K/UL    Absolute Mono # 0.5 0.1 - 1.3 K/UL    Absolute Eos # 0.4 0.0 - 0.8 K/UL    Basophils Absolute 0.0 0.0 - 0.2 K/UL    Absolute Immature Granulocyte 0.0 0.0 - 0.5 K/UL   Urinalysis with Reflex to Culture    Specimen: Urine   Result Value Ref Range    Color, UA YELLOW/STRAW      Appearance CLEAR      Specific Gravity, UA 1.012 1.001 - 1.023      pH, Urine 7.0 5.0 - 9.0      Protein, UA Negative Negative mg/dL    Glucose, UA Negative mg/dL    Ketones, Urine Negative Negative mg/dL    Bilirubin Urine Negative Negative      Blood, Urine TRACE (A) Negative      Urobilinogen, Urine 0.2 0.2 - 1.0 EU/dL    Nitrite, Urine Negative Negative      Leukocyte Esterase, Urine Negative Negative      Urine Culture if Indicated CULTURE NOT INDICATED BY UA RESULT      WBC, UA 0-4 0 - 4 /hpf    RBC, UA 5-10 (A) 0 - 5 /hpf    BACTERIA, URINE Negative Negative /hpf    Epithelial Cells UA 0-5 0 - 5 /hpf    Casts 0-2 0 - 2 /lpf    Mucus, UA 0 0 /lpf No Known Allergies  Prior to Visit Medications    Medication Sig Taking? Authorizing Provider   amitriptyline (ELAVIL) 50 MG tablet Take 1 tablet by mouth nightly Yes Debra Morgan MD   amLODIPine (NORVASC) 5 MG tablet Take 1 tablet by mouth 2 times daily Yes Debra Morgan MD   anastrozole (ARIMIDEX) 1 MG tablet Take 1 tablet by mouth daily Yes Debra Morgan MD   levothyroxine (SYNTHROID) 75 MCG tablet Take 1 tablet by mouth every morning (before breakfast) Yes Debra Morgan MD   oxybutynin (DITROPAN) 5 MG tablet Take 1 tablet by mouth 2 times daily Yes Debra Morgan MD   ramipril (ALTACE) 10 MG capsule Take 1 capsule by mouth daily Yes Debra Morgan MD   valACYclovir (VALTREX) 500 MG tablet Take 1 tablet by mouth 2 times daily Yes Debra Morgan MD   apixaban (ELIQUIS) 5 MG TABS tablet Take 1 tablet by mouth 2 times daily Yes Nain Fitch MD   rosuvastatin (CRESTOR) 20 MG tablet Take 1 tablet by mouth at bedtime Yes Brittany Villatoro PA-C   diclofenac sodium (VOLTAREN) 1 % GEL Apply 2 g topically 3 times daily as needed Yes Ar Automatic Reconciliation   furosemide (LASIX) 20 MG tablet 1/2 tablet (10 mg) daily ONLY AS NEEDED for leg swelling.  Yes Ar Automatic Reconciliation   ibuprofen (ADVIL;MOTRIN) 600 MG tablet Take 600 mg by mouth every 6 hours as needed Yes Ar Automatic Reconciliation       CareTeam (Including outside providers/suppliers regularly involved in providing care):   Patient Care Team:  Debra Morgan MD as PCP - Fide August MD as PCP - [facility]  Jennifer Sanchez MD as Surgeon     Reviewed and updated this visit:  Tobacco  Allergies  Meds  Problems  Med Hx  Surg Hx  Soc Hx  Fam Hx

## 2022-12-12 NOTE — PATIENT INSTRUCTIONS
Learning About Vision Tests  What are vision tests? The four most common vision tests are visual acuity tests, refraction, visual field tests, and color vision tests. Visual acuity (sharpness) tests  These tests are used: To see if you need glasses or contact lenses. To monitor an eye problem. To check an eye injury. Visual acuity tests are done as part of routine exams. You may also have this test when you get your 's license or apply for some types of jobs. Visual field tests  These tests are used: To check for vision loss in any area of your range of vision. To screen for certain eye diseases. To look for nerve damage after a stroke, head injury, or other problem that could reduce blood flow to the brain. Refraction and color tests  A refraction test is done to find the right prescription for glasses and contact lenses. A color vision test is done to check for color blindness. Color vision is often tested as part of a routine exam. You may also have this test when you apply for a job where recognizing different colors is important, such as , electronics, or the [employer]. How are vision tests done? Visual acuity test   You cover one eye at a time. You read aloud from a wall chart across the room. You read aloud from a small card that you hold in your hand. Refraction   You look into a special device. The device puts lenses of different strengths in front of each eye to see how strong your glasses or contact lenses need to be. Visual field tests   Your doctor may have you look through special machines. Or your doctor may simply have you stare straight ahead while they move a finger into and out of your field of vision. Color vision test   You look at pieces of printed test patterns in various colors. You say what number or symbol you see. Your doctor may have you trace the number or symbol using a pointer. How do these tests feel?   There is very little chance of having a problem from this test. If dilating drops are used for a vision test, they may make the eyes sting and cause a medicine taste in the mouth. Follow-up care is a key part of your treatment and safety. Be sure to make and go to all appointments, and call your doctor if you are having problems. It's also a good idea to know your test results and keep a list of the medicines you take. Where can you learn more? Go to http://www.peter.com/ and enter G551 to learn more about \"Learning About Vision Tests. \"  Current as of: October 12, 2022               Content Version: 13.5  © 2106-7581 [employer]. Care instructions adapted under license by [facility] ([facility]). If you have questions about a medical condition or this instruction, always ask your healthcare professional. [address] any warranty or liability for your use of this information. Advance Directives: Care Instructions  Overview  An advance directive is a legal way to state your wishes at the end of your life. It tells your family and your doctor what to do if you can't say what you want. There are two main types of advance directives. You can change them any time your wishes change. Living will. This form tells your family and your doctor your wishes about life support and other treatment. The form is also called a declaration. Medical power of . This form lets you name a person to make treatment decisions for you when you can't speak for yourself. This person is called a health care agent (health care proxy, health care surrogate). The form is also called a durable power of  for health care. If you do not have an advance directive, decisions about your medical care may be made by a family member, or by a doctor or a  who doesn't know you. It may help to think of an advance directive as a gift to the people who care for you.  If you have one, they won't have to make tough decisions by themselves. For more information, including forms for your state, see the [address] website (www.caringinfo.org/planning/advance-directives/). Follow-up care is a key part of your treatment and safety. Be sure to make and go to all appointments, and call your doctor if you are having problems. It's also a good idea to know your test results and keep a list of the medicines you take. What should you include in an advance directive? Many states have a unique advance directive form. (It may ask you to address specific issues.) Or you might use a universal form that's approved by many states. If your form doesn't tell you what to address, it may be hard to know what to include in your advance directive. Use the questions below to help you get started. Who do you want to make decisions about your medical care if you are not able to? What life-support measures do you want if you have a serious illness that gets worse over time or can't be cured? What are you most afraid of that might happen? (Maybe you're afraid of having pain, losing your independence, or being kept alive by machines.)  Where would you prefer to die? (Your home? A hospital? A nursing home?)  Do you want to donate your organs when you die? Do you want certain Christian practices performed before you die? When should you call for help? Be sure to contact your doctor if you have any questions. Where can you learn more? Go to http://www.peter.com/ and enter R264 to learn more about \"Advance Directives: Care Instructions. \"  Current as of: June 16, 2022               Content Version: 13.5  © 8479-6777 [employer]. Care instructions adapted under license by [facility] ([facility]). If you have questions about a medical condition or this instruction, always ask your healthcare professional. [address] any warranty or liability for your use of this information.       Personalized Preventive Plan for Iliana Willams - 12/12/2022  Medicare offers a range of preventive health benefits. Some of the tests and screenings are paid in full while other may be subject to a deductible, co-insurance, and/or copay. Some of these benefits include a comprehensive review of your medical history including lifestyle, illnesses that may run in your family, and various assessments and screenings as appropriate. After reviewing your medical record and screening and assessments performed today your provider may have ordered immunizations, labs, imaging, and/or referrals for you. A list of these orders (if applicable) as well as your Preventive Care list are included within your After Visit Summary for your review. Other Preventive Recommendations:    A preventive eye exam performed by an eye specialist is recommended every 1-2 years to screen for glaucoma; cataracts, macular degeneration, and other eye disorders. A preventive dental visit is recommended every 6 months. Try to get at least 150 minutes of exercise per week or 10,000 steps per day on a pedometer . Order or download the FREE \"Exercise & Physical Activity: Your Everyday Guide\" from The [employer]. Call 6-425.645.6094 or search The [employer] online. You need 6804-1738 mg of calcium and 2558-0604 IU of vitamin D per day. It is possible to meet your calcium requirement with diet alone, but a vitamin D supplement is usually necessary to meet this goal.  When exposed to the sun, use a sunscreen that protects against both UVA and UVB radiation with an SPF of 30 or greater. Reapply every 2 to 3 hours or after sweating, drying off with a towel, or swimming. Always wear a seat belt when traveling in a car. Always wear a helmet when riding a bicycle or motorcycle.

## 2022-12-19 ENCOUNTER — HOSPITAL ENCOUNTER (OUTPATIENT)
Dept: MAMMOGRAPHY | Age: 77
Discharge: HOME | End: 2022-12-22
Payer: MEDICARE

## 2022-12-19 ENCOUNTER — APPOINTMENT (OUTPATIENT)
Dept: MAMMOGRAPHY | Age: 77
End: 2022-12-19
Payer: MEDICARE

## 2022-12-19 DIAGNOSIS — Z12.31 VISIT FOR SCREENING MAMMOGRAM: ICD-10-CM

## 2022-12-19 DIAGNOSIS — Z79.811 AROMATASE INHIBITOR USE: ICD-10-CM

## 2022-12-19 PROCEDURE — 77067 SCR MAMMO BI INCL CAD: CPT

## 2022-12-19 PROCEDURE — 77080 DXA BONE DENSITY AXIAL: CPT

## 2022-12-21 DIAGNOSIS — Z95.818 STATUS POST PLACEMENT OF IMPLANTABLE LOOP RECORDER: ICD-10-CM

## 2022-12-21 DIAGNOSIS — G45.9 TIA (TRANSIENT ISCHEMIC ATTACK): ICD-10-CM

## 2023-01-31 DIAGNOSIS — Z95.818 STATUS POST PLACEMENT OF IMPLANTABLE LOOP RECORDER: ICD-10-CM

## 2023-01-31 DIAGNOSIS — G45.9 TIA (TRANSIENT ISCHEMIC ATTACK): ICD-10-CM

## 2023-03-07 DIAGNOSIS — G45.9 TIA (TRANSIENT ISCHEMIC ATTACK): Primary | ICD-10-CM

## 2023-03-07 DIAGNOSIS — Z95.818 STATUS POST PLACEMENT OF IMPLANTABLE LOOP RECORDER: ICD-10-CM

## 2023-04-04 DIAGNOSIS — G45.9 TIA (TRANSIENT ISCHEMIC ATTACK): ICD-10-CM

## 2023-04-04 DIAGNOSIS — Z95.818 STATUS POST PLACEMENT OF IMPLANTABLE LOOP RECORDER: ICD-10-CM

## 2023-05-11 DIAGNOSIS — Z79.811 AROMATASE INHIBITOR USE: Primary | ICD-10-CM

## 2023-05-11 PROCEDURE — G2066 INTER DEVC REMOTE 30D: HCPCS | Performed by: INTERNAL MEDICINE

## 2023-05-11 PROCEDURE — 93298 REM INTERROG DEV EVAL SCRMS: CPT | Performed by: INTERNAL MEDICINE

## 2023-05-15 ENCOUNTER — CLINICAL DOCUMENTATION (OUTPATIENT)
Dept: ONCOLOGY | Age: 78
End: 2023-05-15

## 2023-05-16 DIAGNOSIS — Z95.818 STATUS POST PLACEMENT OF IMPLANTABLE LOOP RECORDER: ICD-10-CM

## 2023-05-16 DIAGNOSIS — G45.9 TIA (TRANSIENT ISCHEMIC ATTACK): ICD-10-CM

## 2023-06-05 ENCOUNTER — HOSPITAL ENCOUNTER (OUTPATIENT)
Dept: LAB | Age: 78
Discharge: HOME | End: 2023-06-08

## 2023-06-05 DIAGNOSIS — I10 PRIMARY HYPERTENSION: ICD-10-CM

## 2023-06-05 DIAGNOSIS — E03.9 ACQUIRED HYPOTHYROIDISM: ICD-10-CM

## 2023-06-05 DIAGNOSIS — Z11.59 NEED FOR HEPATITIS C SCREENING TEST: ICD-10-CM

## 2023-06-05 DIAGNOSIS — I48.0 PAROXYSMAL ATRIAL FIBRILLATION (HCC): ICD-10-CM

## 2023-06-05 LAB
ALBUMIN SERPL-MCNC: 4.3 G/DL (ref 3.2–4.6)
ALBUMIN/GLOB SERPL: 1.6 (ref 0.4–1.6)
ALP SERPL-CCNC: 90 U/L (ref 50–136)
ALT SERPL-CCNC: 36 U/L (ref 12–65)
ANION GAP SERPL CALC-SCNC: 4 MMOL/L (ref 2–11)
AST SERPL-CCNC: 25 U/L (ref 15–37)
BILIRUB SERPL-MCNC: 0.7 MG/DL (ref 0.2–1.1)
BUN SERPL-MCNC: 12 MG/DL (ref 8–23)
CALCIUM SERPL-MCNC: 9.5 MG/DL (ref 8.3–10.4)
CHLORIDE SERPL-SCNC: 109 MMOL/L (ref 101–110)
CHOLEST SERPL-MCNC: 161 MG/DL
CO2 SERPL-SCNC: 28 MMOL/L (ref 21–32)
CREAT SERPL-MCNC: 0.9 MG/DL (ref 0.6–1)
ERYTHROCYTE [DISTWIDTH] IN BLOOD BY AUTOMATED COUNT: 14 % (ref 11.9–14.6)
GLOBULIN SER CALC-MCNC: 2.7 G/DL (ref 2.8–4.5)
GLUCOSE SERPL-MCNC: 104 MG/DL (ref 65–100)
HCT VFR BLD AUTO: 46.7 % (ref 35.8–46.3)
HCV AB SER QL: NONREACTIVE
HDLC SERPL-MCNC: 79 MG/DL (ref 40–60)
HDLC SERPL: 2
HGB BLD-MCNC: 15.1 G/DL (ref 11.7–15.4)
LDLC SERPL CALC-MCNC: 62.6 MG/DL
MCH RBC QN AUTO: 29.6 PG (ref 26.1–32.9)
MCHC RBC AUTO-ENTMCNC: 32.3 G/DL (ref 31.4–35)
MCV RBC AUTO: 91.6 FL (ref 82–102)
NRBC # BLD: 0 K/UL (ref 0–0.2)
PLATELET # BLD AUTO: 276 K/UL (ref 150–450)
PMV BLD AUTO: 9.8 FL (ref 9.4–12.3)
POTASSIUM SERPL-SCNC: 4.4 MMOL/L (ref 3.5–5.1)
PROT SERPL-MCNC: 7 G/DL (ref 6.3–8.2)
RBC # BLD AUTO: 5.1 M/UL (ref 4.05–5.2)
SODIUM SERPL-SCNC: 141 MMOL/L (ref 133–143)
TRIGL SERPL-MCNC: 97 MG/DL (ref 35–150)
TSH, 3RD GENERATION: 1.47 UIU/ML (ref 0.36–3.74)
VLDLC SERPL CALC-MCNC: 19.4 MG/DL (ref 6–23)
WBC # BLD AUTO: 7.3 K/UL (ref 4.3–11.1)

## 2023-06-12 ENCOUNTER — OFFICE VISIT (OUTPATIENT)
Dept: INTERNAL MEDICINE CLINIC | Facility: CLINIC | Age: 78
End: 2023-06-12
Payer: MEDICARE

## 2023-06-12 VITALS
SYSTOLIC BLOOD PRESSURE: 128 MMHG | OXYGEN SATURATION: 97 % | TEMPERATURE: 97.9 F | HEART RATE: 77 BPM | WEIGHT: 167 LBS | HEIGHT: 70 IN | BODY MASS INDEX: 23.91 KG/M2 | DIASTOLIC BLOOD PRESSURE: 64 MMHG

## 2023-06-12 DIAGNOSIS — G45.9 TIA (TRANSIENT ISCHEMIC ATTACK): Primary | ICD-10-CM

## 2023-06-12 DIAGNOSIS — I48.0 PAROXYSMAL ATRIAL FIBRILLATION (HCC): ICD-10-CM

## 2023-06-12 DIAGNOSIS — Z12.11 COLON CANCER SCREENING: ICD-10-CM

## 2023-06-12 DIAGNOSIS — D05.11 DUCTAL CARCINOMA IN SITU (DCIS) OF RIGHT BREAST: ICD-10-CM

## 2023-06-12 DIAGNOSIS — F51.01 PRIMARY INSOMNIA: ICD-10-CM

## 2023-06-12 PROCEDURE — 3074F SYST BP LT 130 MM HG: CPT | Performed by: INTERNAL MEDICINE

## 2023-06-12 PROCEDURE — 1123F ACP DISCUSS/DSCN MKR DOCD: CPT | Performed by: INTERNAL MEDICINE

## 2023-06-12 PROCEDURE — 1090F PRES/ABSN URINE INCON ASSESS: CPT | Performed by: INTERNAL MEDICINE

## 2023-06-12 PROCEDURE — G8399 PT W/DXA RESULTS DOCUMENT: HCPCS | Performed by: INTERNAL MEDICINE

## 2023-06-12 PROCEDURE — 1036F TOBACCO NON-USER: CPT | Performed by: INTERNAL MEDICINE

## 2023-06-12 PROCEDURE — G8427 DOCREV CUR MEDS BY ELIG CLIN: HCPCS | Performed by: INTERNAL MEDICINE

## 2023-06-12 PROCEDURE — 3078F DIAST BP <80 MM HG: CPT | Performed by: INTERNAL MEDICINE

## 2023-06-12 PROCEDURE — 99214 OFFICE O/P EST MOD 30 MIN: CPT | Performed by: INTERNAL MEDICINE

## 2023-06-12 PROCEDURE — G8420 CALC BMI NORM PARAMETERS: HCPCS | Performed by: INTERNAL MEDICINE

## 2023-06-12 PROCEDURE — 3017F COLORECTAL CA SCREEN DOC REV: CPT | Performed by: INTERNAL MEDICINE

## 2023-06-12 RX ORDER — ZOLPIDEM TARTRATE 5 MG/1
5 TABLET ORAL NIGHTLY PRN
Qty: 30 TABLET | Refills: 0 | Status: SHIPPED | OUTPATIENT
Start: 2023-06-12 | End: 2023-07-12

## 2023-06-12 RX ORDER — ANASTROZOLE 1 MG/1
1 TABLET ORAL DAILY
Qty: 90 TABLET | Refills: 3 | Status: SHIPPED | OUTPATIENT
Start: 2023-06-12

## 2023-06-12 ASSESSMENT — ENCOUNTER SYMPTOMS
SHORTNESS OF BREATH: 0
ANAL BLEEDING: 0

## 2023-06-28 ENCOUNTER — HOSPITAL ENCOUNTER (OUTPATIENT)
Dept: CT IMAGING | Age: 78
Discharge: HOME | End: 2023-07-01
Attending: INTERNAL MEDICINE
Payer: MEDICARE

## 2023-06-28 DIAGNOSIS — D05.11 DUCTAL CARCINOMA IN SITU (DCIS) OF RIGHT BREAST: ICD-10-CM

## 2023-06-28 DIAGNOSIS — F51.01 PRIMARY INSOMNIA: ICD-10-CM

## 2023-06-28 DIAGNOSIS — I48.0 PAROXYSMAL ATRIAL FIBRILLATION (HCC): ICD-10-CM

## 2023-06-28 DIAGNOSIS — G45.9 TIA (TRANSIENT ISCHEMIC ATTACK): ICD-10-CM

## 2023-06-28 DIAGNOSIS — E78.5 DYSLIPIDEMIA: ICD-10-CM

## 2023-06-28 PROCEDURE — 70450 CT HEAD/BRAIN W/O DYE: CPT

## 2023-06-28 RX ORDER — ROSUVASTATIN CALCIUM 20 MG/1
20 TABLET, COATED ORAL NIGHTLY
Qty: 90 TABLET | Refills: 3 | Status: SHIPPED | OUTPATIENT
Start: 2023-06-28

## 2023-07-11 PROCEDURE — G2066 INTER DEVC REMOTE 30D: HCPCS | Performed by: INTERNAL MEDICINE

## 2023-07-11 PROCEDURE — 93298 REM INTERROG DEV EVAL SCRMS: CPT | Performed by: INTERNAL MEDICINE

## 2023-08-03 DIAGNOSIS — Z95.818 STATUS POST PLACEMENT OF IMPLANTABLE LOOP RECORDER: ICD-10-CM

## 2023-08-03 DIAGNOSIS — G45.9 TIA (TRANSIENT ISCHEMIC ATTACK): ICD-10-CM

## 2023-08-08 PROCEDURE — G2066 INTER DEVC REMOTE 30D: HCPCS | Performed by: INTERNAL MEDICINE

## 2023-08-08 PROCEDURE — 93298 REM INTERROG DEV EVAL SCRMS: CPT | Performed by: INTERNAL MEDICINE

## 2023-08-30 ENCOUNTER — TELEPHONE (OUTPATIENT)
Dept: INTERNAL MEDICINE CLINIC | Facility: CLINIC | Age: 78
End: 2023-08-30

## 2023-08-30 NOTE — TELEPHONE ENCOUNTER
----- Message from Clifford Genao sent at 8/30/2023 11:44 AM EDT -----  Subject: Message to Provider    QUESTIONS  Information for Provider? Patient returned the call, no answer.  Did not   fine any appointments to reschedule, went all the way into March, would   like to know if she can schedule her AWV with anyone else  ---------------------------------------------------------------------------  --------------  [address] [place]  [phone]; OK to leave message on voicemail  ---------------------------------------------------------------------------  --------------  SCRIPT ANSWERS  undefined

## 2023-09-12 PROCEDURE — 93298 REM INTERROG DEV EVAL SCRMS: CPT | Performed by: INTERNAL MEDICINE

## 2023-09-12 PROCEDURE — G2066 INTER DEVC REMOTE 30D: HCPCS | Performed by: INTERNAL MEDICINE

## 2023-09-18 DIAGNOSIS — G45.9 TIA (TRANSIENT ISCHEMIC ATTACK): ICD-10-CM

## 2023-09-18 DIAGNOSIS — Z95.818 STATUS POST PLACEMENT OF IMPLANTABLE LOOP RECORDER: ICD-10-CM

## 2023-10-13 PROCEDURE — G2066 INTER DEVC REMOTE 30D: HCPCS | Performed by: INTERNAL MEDICINE

## 2023-10-13 PROCEDURE — 93298 REM INTERROG DEV EVAL SCRMS: CPT | Performed by: INTERNAL MEDICINE

## 2023-11-01 NOTE — PROGRESS NOTES
[facility] CARDIOLOGY  [id number] [place] [address]  PHONE: [phone]      23    NAME:  Jeff Martins  : 1945  MRN: [id number]         SUBJECTIVE:   Jeff Martins is a 70 y.o. female seen for a follow up visit regarding the following:     Chief Complaint   Patient presents with    Annual Exam    Atrial Fibrillation    Hypertension            HPI:  Follow up  Annual Exam, Atrial Fibrillation, and Hypertension   . Follow up prior cryptogenic TIA culminating in loop recorder implant having now demonstrated afib. Afib burden remains very low by ongoing ILR interrogation. Walking for some exercise, no palpitations. PAST CARDIAC HISTORY:    - breast cancer, lumpectomy only, arimidex   2021 - TIA manifesting with dysarthria   Brain MRI and CT no acute abnormality   MRA no large vessel or intracranial stenosis   21 - Echo EF 55-60%, impaired relaxation, negative bubble study   Dec 2021- 30 day monitor - NSR, ST, rare ectopic, 4 beats nsvt, 16 beats PAT, asymptomatic, no symptoms reported. 2022- ILR implanted  ~2022       Afib - rate control/anticoagulated            Key CAD CHF Meds            rosuvastatin (CRESTOR) 20 MG tablet (Taking)    Sig - Route: Take 1 tablet by mouth at bedtime - Oral    apixaban (ELIQUIS) 5 MG TABS tablet (Taking)    Sig - Route: Take 1 tablet by mouth 2 times daily - Oral    Class: Print    amLODIPine (NORVASC) 5 MG tablet (Taking)    Sig - Route: Take 1 tablet by mouth 2 times daily - Oral    Class: Print    ramipril (ALTACE) 10 MG capsule (Taking)    Sig - Route: Take 1 capsule by mouth daily - Oral    Class: Print    furosemide (LASIX) 20 MG tablet (Taking)    Class: Historical Med          Key Antihyperglycemic Medications       Patient is on no antihyperglycemic meds.                 Past Medical History, Past Surgical History, Family history, Social History, and Medications were all reviewed with

## 2023-11-02 ENCOUNTER — OFFICE VISIT (OUTPATIENT)
Age: 78
End: 2023-11-02

## 2023-11-02 VITALS
DIASTOLIC BLOOD PRESSURE: 80 MMHG | HEIGHT: 70 IN | WEIGHT: 167.4 LBS | SYSTOLIC BLOOD PRESSURE: 138 MMHG | HEART RATE: 69 BPM | BODY MASS INDEX: 23.96 KG/M2

## 2023-11-02 DIAGNOSIS — I48.0 PAROXYSMAL ATRIAL FIBRILLATION (HCC): Primary | ICD-10-CM

## 2023-11-02 DIAGNOSIS — I10 ESSENTIAL HYPERTENSION WITH GOAL BLOOD PRESSURE LESS THAN 130/85: ICD-10-CM

## 2023-11-02 ASSESSMENT — ENCOUNTER SYMPTOMS: SHORTNESS OF BREATH: 0

## 2023-11-03 ENCOUNTER — PATIENT MESSAGE (OUTPATIENT)
Age: 78
End: 2023-11-03

## 2023-11-03 DIAGNOSIS — I48.0 PAROXYSMAL ATRIAL FIBRILLATION (HCC): ICD-10-CM

## 2023-11-03 NOTE — TELEPHONE ENCOUNTER
Requested Prescriptions     Signed Prescriptions Disp Refills    apixaban (ELIQUIS) 5 MG TABS tablet 180 tablet 3     Sig: Take 1 tablet by mouth 2 times daily     Authorizing Provider: Franco WALDROP     Ordering User: Reyes Jackson   Rx faxed to [facility].

## 2023-11-13 RX ORDER — AMLODIPINE BESYLATE 5 MG/1
5 TABLET ORAL 2 TIMES DAILY
Qty: 180 TABLET | Refills: 3 | Status: SHIPPED | OUTPATIENT
Start: 2023-11-13

## 2023-11-16 DIAGNOSIS — I48.0 PAROXYSMAL ATRIAL FIBRILLATION (HCC): ICD-10-CM

## 2023-11-16 NOTE — PROGRESS NOTES
Requested Prescriptions     Signed Prescriptions Disp Refills    apixaban (ELIQUIS) 5 MG TABS tablet 180 tablet 3     Sig: Take 1 tablet by mouth 2 times daily   Paper prescription request received. Faxed to [facility].

## 2023-12-05 DIAGNOSIS — E78.5 DYSLIPIDEMIA: ICD-10-CM

## 2023-12-05 DIAGNOSIS — I10 PRIMARY HYPERTENSION: Primary | ICD-10-CM

## 2023-12-05 DIAGNOSIS — E03.9 ACQUIRED HYPOTHYROIDISM: ICD-10-CM

## 2023-12-12 DIAGNOSIS — E03.9 ACQUIRED HYPOTHYROIDISM: ICD-10-CM

## 2023-12-12 DIAGNOSIS — I10 PRIMARY HYPERTENSION: ICD-10-CM

## 2023-12-12 DIAGNOSIS — E78.5 DYSLIPIDEMIA: ICD-10-CM

## 2023-12-12 LAB
ALBUMIN SERPL-MCNC: 4.1 G/DL (ref 3.2–4.6)
ALBUMIN/GLOB SERPL: 1.4 (ref 0.4–1.6)
ALP SERPL-CCNC: 98 U/L (ref 50–136)
ALT SERPL-CCNC: 38 U/L (ref 12–65)
ANION GAP SERPL CALC-SCNC: 3 MMOL/L (ref 2–11)
AST SERPL-CCNC: 24 U/L (ref 15–37)
BASOPHILS # BLD: 0 K/UL (ref 0–0.2)
BASOPHILS NFR BLD: 0 % (ref 0–2)
BILIRUB SERPL-MCNC: 0.8 MG/DL (ref 0.2–1.1)
BUN SERPL-MCNC: 16 MG/DL (ref 8–23)
CALCIUM SERPL-MCNC: 10 MG/DL (ref 8.3–10.4)
CHLORIDE SERPL-SCNC: 108 MMOL/L (ref 103–113)
CHOLEST SERPL-MCNC: 163 MG/DL
CO2 SERPL-SCNC: 29 MMOL/L (ref 21–32)
CREAT SERPL-MCNC: 0.8 MG/DL (ref 0.6–1)
DIFFERENTIAL METHOD BLD: NORMAL
EOSINOPHIL # BLD: 0.4 K/UL (ref 0–0.8)
EOSINOPHIL NFR BLD: 6 % (ref 0.5–7.8)
ERYTHROCYTE [DISTWIDTH] IN BLOOD BY AUTOMATED COUNT: 14.1 % (ref 11.9–14.6)
GLOBULIN SER CALC-MCNC: 2.9 G/DL (ref 2.8–4.5)
GLUCOSE SERPL-MCNC: 94 MG/DL (ref 65–100)
HCT VFR BLD AUTO: 45.7 % (ref 35.8–46.3)
HDLC SERPL-MCNC: 71 MG/DL (ref 40–60)
HDLC SERPL: 2.3
HGB BLD-MCNC: 15 G/DL (ref 11.7–15.4)
IMM GRANULOCYTES # BLD AUTO: 0 K/UL (ref 0–0.5)
IMM GRANULOCYTES NFR BLD AUTO: 0 % (ref 0–5)
LDLC SERPL CALC-MCNC: 75.4 MG/DL
LYMPHOCYTES # BLD: 2.5 K/UL (ref 0.5–4.6)
LYMPHOCYTES NFR BLD: 37 % (ref 13–44)
MCH RBC QN AUTO: 29.8 PG (ref 26.1–32.9)
MCHC RBC AUTO-ENTMCNC: 32.8 G/DL (ref 31.4–35)
MONOCYTES # BLD: 0.5 K/UL (ref 0.1–1.3)
MONOCYTES NFR BLD: 8 % (ref 4–12)
NEUTS SEG # BLD: 3.2 K/UL (ref 1.7–8.2)
NEUTS SEG NFR BLD: 49 % (ref 43–78)
NRBC # BLD: 0 K/UL (ref 0–0.2)
PLATELET # BLD AUTO: 290 K/UL (ref 150–450)
PMV BLD AUTO: 9.6 FL (ref 9.4–12.3)
POTASSIUM SERPL-SCNC: 4.2 MMOL/L (ref 3.5–5.1)
PROT SERPL-MCNC: 7 G/DL (ref 6.3–8.2)
RBC # BLD AUTO: 5.03 M/UL (ref 4.05–5.2)
SODIUM SERPL-SCNC: 140 MMOL/L (ref 136–146)
TRIGL SERPL-MCNC: 83 MG/DL (ref 35–150)
TSH, 3RD GENERATION: 1.52 UIU/ML (ref 0.36–3.74)
VLDLC SERPL CALC-MCNC: 16.6 MG/DL (ref 6–23)
WBC # BLD AUTO: 6.7 K/UL (ref 4.3–11.1)

## 2023-12-13 RX ORDER — RESPIRATORY SYNCYTIAL VIRUS VACCINE 120MCG/0.5
0.5 KIT INTRAMUSCULAR ONCE
Qty: 0.5 ML | Refills: 0 | Status: SHIPPED | OUTPATIENT
Start: 2023-12-13 | End: 2023-12-13

## 2023-12-13 NOTE — TELEPHONE ENCOUNTER
Orders Placed This Encounter    respiratory syncytial vaccine (ABRYSVO) 120 MCG/0.5ML SOLR injection     Sig: Inject 0.5 mLs into the muscle once for 1 dose     Dispense:  0.5 mL     Refill:  0         Panchito Cho MD

## 2023-12-18 PROCEDURE — 93298 REM INTERROG DEV EVAL SCRMS: CPT | Performed by: INTERNAL MEDICINE

## 2023-12-18 PROCEDURE — G2066 INTER DEVC REMOTE 30D: HCPCS | Performed by: INTERNAL MEDICINE

## 2024-02-08 ENCOUNTER — OFFICE VISIT (OUTPATIENT)
Dept: AUDIOLOGY | Age: 79
End: 2024-02-08

## 2024-02-08 ENCOUNTER — OFFICE VISIT (OUTPATIENT)
Dept: ENT CLINIC | Age: 79
End: 2024-02-08
Payer: MEDICARE

## 2024-02-08 VITALS — HEIGHT: 61 IN | BODY MASS INDEX: 31.53 KG/M2 | WEIGHT: 167 LBS

## 2024-02-08 DIAGNOSIS — H90.3 SENSORINEURAL HEARING LOSS (SNHL) OF BOTH EARS: Primary | Chronic | ICD-10-CM

## 2024-02-08 DIAGNOSIS — H93.13 TINNITUS OF BOTH EARS: Chronic | ICD-10-CM

## 2024-02-08 DIAGNOSIS — H90.3 SENSORINEURAL HEARING LOSS, BILATERAL: Primary | ICD-10-CM

## 2024-02-08 PROCEDURE — G8484 FLU IMMUNIZE NO ADMIN: HCPCS | Performed by: PHYSICIAN ASSISTANT

## 2024-02-08 PROCEDURE — 1090F PRES/ABSN URINE INCON ASSESS: CPT | Performed by: PHYSICIAN ASSISTANT

## 2024-02-08 PROCEDURE — 1036F TOBACCO NON-USER: CPT | Performed by: PHYSICIAN ASSISTANT

## 2024-02-08 PROCEDURE — G8427 DOCREV CUR MEDS BY ELIG CLIN: HCPCS | Performed by: PHYSICIAN ASSISTANT

## 2024-02-08 PROCEDURE — G8417 CALC BMI ABV UP PARAM F/U: HCPCS | Performed by: PHYSICIAN ASSISTANT

## 2024-02-08 PROCEDURE — G8399 PT W/DXA RESULTS DOCUMENT: HCPCS | Performed by: PHYSICIAN ASSISTANT

## 2024-02-08 PROCEDURE — 1123F ACP DISCUSS/DSCN MKR DOCD: CPT | Performed by: PHYSICIAN ASSISTANT

## 2024-02-08 PROCEDURE — 99204 OFFICE O/P NEW MOD 45 MIN: CPT | Performed by: PHYSICIAN ASSISTANT

## 2024-02-08 NOTE — PROGRESS NOTES
AUDIOLOGY EVALUATION    Cari Hook had Tympanometry and Audiometry performed today.    The patient reports hearing loss.     Results as follows:    Tympanometry    Type A -  bilaterally    Audiometry    Test Performed - Comprehensive Audiogram    Type of Loss - Right Ear: abnormal hearing: degree of loss is normal to moderately severe sensorineural hearing loss                           Left Ear: abnormal hearing: degree of loss is normal to moderately severe sensorineural hearing loss    SRT   Measurement Right Ear Left Ear   Value 20 20   Unit dB dB     Discrimination  Measurement Right Ear Left Ear   Value 100% 100%   Unit dB dB     Recommend  Binaural amplification and annual audios    Zaria Engel [facility]  Audiologist

## 2024-02-08 NOTE — PROGRESS NOTES
Cari Hook is a 78 y.o. female presents today with c/o hearing loss. She does not notice any trouble but her  complains her hearing is quite decreased.  Constant ringing tinnitus in both ears without pulsatile quality it most often sounds like crickets.  She denies pain drainage or recurring infections and has no noise exposure history.  The patient is without other complaints.    Audiogram:     Left ear: Normal hearing to mod severe snhl  Right ear: Normal hearing to mod severe snhl  Discrimination score: left ear 100 % and right ear 100 %  Tympanogram: left ear Type A and right ear Type A.       Chief Complaint   Patient presents with    New Patient    Hearing Problem       Patient Active Problem List   Diagnosis    Essential hypertension with goal blood pressure less than 130/85    TIA (transient ischemic attack)    Ductal carcinoma in situ (DCIS) of right breast    Hypertension    Chronic insomnia    Dyslipidemia    Family history of breast cancer in first degree relative    OAB (overactive bladder)    Hypothyroidism    Paroxysmal atrial fibrillation (HCC)        Reviewed and updated this visit by provider:  Tobacco  Allergies  Meds  Problems  Med Hx  Surg Hx  Fam Hx         Review of Systems     Ht 1.554 m (5' 1.2\")   Wt 75.8 kg (167 lb)   BMI 31.35 kg/m²     Physical Exam:    General: Well developed, well nourished, in no acute distress  Communication: The patient communicates appropriately for their age.  Voice: Normal.  Head, Face, and Salivary Glands: No head or facial abnormalities present, No masses or lesions present, Overall appearance is normal, No abnormality of parotid or submandibular glands present.    External Ears: appearance is normal with no scars, lesions or masses.   Right Ear:  Canals is normal, Tympanic membrane with normal landmarks and normal mobility, no retraction, inflammation, effusion.  Left  Ear: Canal is normal, Tympanic membrane with normal landmarks and

## 2024-02-12 PROCEDURE — 93298 REM INTERROG DEV EVAL SCRMS: CPT | Performed by: INTERNAL MEDICINE

## 2024-02-14 ENCOUNTER — HOSPITAL ENCOUNTER (OUTPATIENT)
Dept: MAMMOGRAPHY | Age: 79
Discharge: HOME | End: 2024-02-17
Payer: MEDICARE

## 2024-02-14 DIAGNOSIS — Z12.31 SCREENING MAMMOGRAM FOR BREAST CANCER: ICD-10-CM

## 2024-02-14 PROCEDURE — 77063 BREAST TOMOSYNTHESIS BI: CPT

## 2024-02-20 ENCOUNTER — TELEPHONE (OUTPATIENT)
Dept: INTERNAL MEDICINE CLINIC | Facility: CLINIC | Age: 79
End: 2024-02-20

## 2024-05-24 PROCEDURE — 93298 REM INTERROG DEV EVAL SCRMS: CPT | Performed by: INTERNAL MEDICINE

## 2024-06-12 ENCOUNTER — NURSE ONLY (OUTPATIENT)
Dept: INTERNAL MEDICINE CLINIC | Facility: CLINIC | Age: 79
End: 2024-06-12

## 2024-06-12 DIAGNOSIS — E78.5 DYSLIPIDEMIA: ICD-10-CM

## 2024-06-12 DIAGNOSIS — E03.9 ACQUIRED HYPOTHYROIDISM: ICD-10-CM

## 2024-06-12 LAB
ALBUMIN SERPL-MCNC: 4.5 G/DL (ref 3.2–4.6)
ALBUMIN/GLOB SERPL: 1.8 (ref 1–1.9)
ALP SERPL-CCNC: 90 U/L (ref 35–104)
ALT SERPL-CCNC: 31 U/L (ref 12–65)
ANION GAP SERPL CALC-SCNC: 13 MMOL/L (ref 9–18)
AST SERPL-CCNC: 33 U/L (ref 15–37)
BASOPHILS # BLD: 0 K/UL (ref 0–0.2)
BASOPHILS NFR BLD: 0 % (ref 0–2)
BILIRUB SERPL-MCNC: 0.6 MG/DL (ref 0–1.2)
BUN SERPL-MCNC: 13 MG/DL (ref 8–23)
CALCIUM SERPL-MCNC: 9.7 MG/DL (ref 8.8–10.2)
CHLORIDE SERPL-SCNC: 104 MMOL/L (ref 98–107)
CHOLEST SERPL-MCNC: 149 MG/DL (ref 0–200)
CO2 SERPL-SCNC: 25 MMOL/L (ref 20–28)
CREAT SERPL-MCNC: 0.83 MG/DL (ref 0.6–1.1)
DIFFERENTIAL METHOD BLD: ABNORMAL
EOSINOPHIL NFR BLD: 5 % (ref 0.5–7.8)
ERYTHROCYTE [DISTWIDTH] IN BLOOD BY AUTOMATED COUNT: 14.3 % (ref 11.9–14.6)
GLOBULIN SER CALC-MCNC: 2.5 G/DL (ref 2.3–3.5)
GLUCOSE SERPL-MCNC: 101 MG/DL (ref 70–99)
HCT VFR BLD AUTO: 46.4 % (ref 35.8–46.3)
HDLC SERPL-MCNC: 65 MG/DL (ref 40–60)
HDLC SERPL: 2.3 (ref 0–5)
HGB BLD-MCNC: 14.9 G/DL (ref 11.7–15.4)
IMM GRANULOCYTES # BLD AUTO: 0 K/UL (ref 0–0.5)
IMM GRANULOCYTES NFR BLD AUTO: 0 % (ref 0–5)
LDLC SERPL CALC-MCNC: 61 MG/DL (ref 0–100)
LYMPHOCYTES # BLD: 2.5 K/UL (ref 0.5–4.6)
LYMPHOCYTES NFR BLD: 35 % (ref 13–44)
MCH RBC QN AUTO: 29.3 PG (ref 26.1–32.9)
MCHC RBC AUTO-ENTMCNC: 32.1 G/DL (ref 31.4–35)
MCV RBC AUTO: 91.2 FL (ref 82–102)
MONOCYTES # BLD: 0.5 K/UL (ref 0.1–1.3)
MONOCYTES NFR BLD: 7 % (ref 4–12)
NEUTS SEG # BLD: 3.8 K/UL (ref 1.7–8.2)
NEUTS SEG NFR BLD: 53 % (ref 43–78)
NRBC # BLD: 0 K/UL (ref 0–0.2)
PLATELET # BLD AUTO: 205 K/UL (ref 150–450)
PMV BLD AUTO: 10.7 FL (ref 9.4–12.3)
POTASSIUM SERPL-SCNC: 3.9 MMOL/L (ref 3.5–5.1)
PROT SERPL-MCNC: 7 G/DL (ref 6.3–8.2)
RBC # BLD AUTO: 5.09 M/UL (ref 4.05–5.2)
SODIUM SERPL-SCNC: 142 MMOL/L (ref 136–145)
TRIGL SERPL-MCNC: 117 MG/DL (ref 0–150)
TSH, 3RD GENERATION: 2.4 UIU/ML (ref 0.27–4.2)
VLDLC SERPL CALC-MCNC: 23 MG/DL (ref 6–23)
WBC # BLD AUTO: 7.2 K/UL (ref 4.3–11.1)

## 2024-06-18 ENCOUNTER — OFFICE VISIT (OUTPATIENT)
Dept: INTERNAL MEDICINE CLINIC | Facility: CLINIC | Age: 79
End: 2024-06-18
Payer: MEDICARE

## 2024-06-18 VITALS
WEIGHT: 169 LBS | TEMPERATURE: 97.2 F | SYSTOLIC BLOOD PRESSURE: 118 MMHG | OXYGEN SATURATION: 98 % | HEIGHT: 70 IN | HEART RATE: 65 BPM | BODY MASS INDEX: 24.2 KG/M2 | DIASTOLIC BLOOD PRESSURE: 64 MMHG

## 2024-06-18 DIAGNOSIS — E78.5 DYSLIPIDEMIA: ICD-10-CM

## 2024-06-18 DIAGNOSIS — I48.0 PAROXYSMAL ATRIAL FIBRILLATION (HCC): ICD-10-CM

## 2024-06-18 DIAGNOSIS — G25.81 RLS (RESTLESS LEGS SYNDROME): ICD-10-CM

## 2024-06-18 DIAGNOSIS — I10 PRIMARY HYPERTENSION: Primary | ICD-10-CM

## 2024-06-18 DIAGNOSIS — D05.11 DUCTAL CARCINOMA IN SITU (DCIS) OF RIGHT BREAST: ICD-10-CM

## 2024-06-18 DIAGNOSIS — L98.9 SKIN ABNORMALITIES: ICD-10-CM

## 2024-06-18 DIAGNOSIS — E03.9 ACQUIRED HYPOTHYROIDISM: ICD-10-CM

## 2024-06-18 DIAGNOSIS — F51.04 CHRONIC INSOMNIA: ICD-10-CM

## 2024-06-18 DIAGNOSIS — N32.81 OAB (OVERACTIVE BLADDER): ICD-10-CM

## 2024-06-18 PROCEDURE — 1123F ACP DISCUSS/DSCN MKR DOCD: CPT | Performed by: INTERNAL MEDICINE

## 2024-06-18 PROCEDURE — G8399 PT W/DXA RESULTS DOCUMENT: HCPCS | Performed by: INTERNAL MEDICINE

## 2024-06-18 PROCEDURE — 99214 OFFICE O/P EST MOD 30 MIN: CPT | Performed by: INTERNAL MEDICINE

## 2024-06-18 PROCEDURE — G2211 COMPLEX E/M VISIT ADD ON: HCPCS | Performed by: INTERNAL MEDICINE

## 2024-06-18 PROCEDURE — 3074F SYST BP LT 130 MM HG: CPT | Performed by: INTERNAL MEDICINE

## 2024-06-18 PROCEDURE — G8427 DOCREV CUR MEDS BY ELIG CLIN: HCPCS | Performed by: INTERNAL MEDICINE

## 2024-06-18 PROCEDURE — 1090F PRES/ABSN URINE INCON ASSESS: CPT | Performed by: INTERNAL MEDICINE

## 2024-06-18 PROCEDURE — G8420 CALC BMI NORM PARAMETERS: HCPCS | Performed by: INTERNAL MEDICINE

## 2024-06-18 PROCEDURE — 3078F DIAST BP <80 MM HG: CPT | Performed by: INTERNAL MEDICINE

## 2024-06-18 PROCEDURE — 1036F TOBACCO NON-USER: CPT | Performed by: INTERNAL MEDICINE

## 2024-06-18 RX ORDER — ZOLPIDEM TARTRATE 5 MG/1
5 TABLET ORAL NIGHTLY PRN
Qty: 30 TABLET | Refills: 5 | Status: SHIPPED | OUTPATIENT
Start: 2024-06-18 | End: 2024-12-15

## 2024-06-18 RX ORDER — ANASTROZOLE 1 MG/1
1 TABLET ORAL DAILY
Qty: 90 TABLET | Refills: 3 | Status: SHIPPED | OUTPATIENT
Start: 2024-06-18

## 2024-06-18 RX ORDER — AMLODIPINE BESYLATE 5 MG/1
5 TABLET ORAL 2 TIMES DAILY
Qty: 180 TABLET | Refills: 3 | Status: SHIPPED | OUTPATIENT
Start: 2024-06-18

## 2024-06-18 RX ORDER — OXYBUTYNIN CHLORIDE 5 MG/1
5 TABLET ORAL 2 TIMES DAILY
Qty: 180 TABLET | Refills: 3 | Status: SHIPPED | OUTPATIENT
Start: 2024-06-18

## 2024-06-18 RX ORDER — LEVOTHYROXINE SODIUM 0.07 MG/1
75 TABLET ORAL
Qty: 90 TABLET | Refills: 3 | Status: SHIPPED | OUTPATIENT
Start: 2024-06-18

## 2024-06-18 RX ORDER — AMITRIPTYLINE HYDROCHLORIDE 50 MG/1
50 TABLET, FILM COATED ORAL NIGHTLY
Qty: 90 TABLET | Refills: 3 | Status: SHIPPED | OUTPATIENT
Start: 2024-06-18

## 2024-06-18 RX ORDER — ROSUVASTATIN CALCIUM 20 MG/1
20 TABLET, COATED ORAL NIGHTLY
Qty: 90 TABLET | Refills: 3 | Status: SHIPPED | OUTPATIENT
Start: 2024-06-18

## 2024-06-18 RX ORDER — RAMIPRIL 10 MG/1
10 CAPSULE ORAL DAILY
Qty: 90 CAPSULE | Refills: 3 | Status: SHIPPED | OUTPATIENT
Start: 2024-06-18

## 2024-06-18 NOTE — ASSESSMENT & PLAN NOTE
Monitored by specialist- no acute findings meriting change in the plan    Treatment regimen is effective.       Cardiac Medications       ACE Inhibitors       ramipril (ALTACE) 10 MG capsule Take 1 capsule by mouth daily      Calcium Channel Blockers       amLODIPine (NORVASC) 5 MG tablet Take 1 tablet by mouth 2 times daily      Loop Diuretics       furosemide (LASIX) 20 MG tablet Take 1 tablet by mouth daily as needed (leg swelling) 1/2 tablet (10 mg) daily ONLY AS NEEDED for leg swelling.      HMG CoA Reductase Inhibitors       rosuvastatin (CRESTOR) 20 MG tablet Take 1 tablet by mouth at bedtime      Direct Factor Xa Inhibitors       apixaban (ELIQUIS) 5 MG TABS tablet Take 1 tablet by mouth 2 times daily

## 2024-06-18 NOTE — PROGRESS NOTES
ASSESSMENT/PLAN:    1. Primary hypertension  Assessment & Plan:   Well-controlled, continue current medications  Treatment regimen is effective.   .  Lab Results   Component Value Date/Time     06/12/2024 09:59 AM    K 3.9 06/12/2024 09:59 AM     06/12/2024 09:59 AM    CO2 25 06/12/2024 09:59 AM    BUN 13 06/12/2024 09:59 AM    CREATININE 0.83 06/12/2024 09:59 AM    GLUCOSE 101 06/12/2024 09:59 AM    CALCIUM 9.7 06/12/2024 09:59 AM    LABGLOM 73 06/12/2024 09:59 AM    LABGLOM >60 12/12/2023 11:10 AM      Hypertension Medications       ACE Inhibitors       ramipril (ALTACE) 10 MG capsule Take 1 capsule by mouth daily      Calcium Channel Blockers       amLODIPine (NORVASC) 5 MG tablet Take 1 tablet by mouth 2 times daily      Loop Diuretics       furosemide (LASIX) 20 MG tablet Take 1 tablet by mouth daily as needed (leg swelling) 1/2 tablet (10 mg) daily ONLY AS NEEDED for leg swelling.            Orders:  -     amLODIPine (NORVASC) 5 MG tablet; Take 1 tablet by mouth 2 times daily, Disp-180 tablet, R-3Normal  -     ramipril (ALTACE) 10 MG capsule; Take 1 capsule by mouth daily, Disp-90 capsule, R-3Normal  -     Urinalysis; Future  -     Comprehensive Metabolic Panel; Future  -     CBC with Auto Differential; Future  2. Ductal carcinoma in situ (DCIS) of right breast  -     anastrozole (ARIMIDEX) 1 MG tablet; Take 1 tablet by mouth daily, Disp-90 tablet, R-3Normal  3. Dyslipidemia  Assessment & Plan:   Well-controlled, continue current medications    Lab Results   Component Value Date    CHOL 149 06/12/2024    TRIG 117 06/12/2024    HDL 65 (H) 06/12/2024    LDL 61 06/12/2024    VLDL 23 06/12/2024    CHOLHDLRATIO 2.3 06/12/2024     Lipid Lowering Medications       HMG CoA Reductase Inhibitors       rosuvastatin (CRESTOR) 20 MG tablet Take 1 tablet by mouth at bedtime            Orders:  -     rosuvastatin (CRESTOR) 20 MG tablet; Take 1 tablet by mouth at bedtime, Disp-90 tablet, R-3Normal  -

## 2024-06-18 NOTE — ASSESSMENT & PLAN NOTE
Treatment regimen is effective.     Medication refilled    Controlled Substance Monitoring:    Acute and Chronic Pain Monitoring:      [facility] RX Monitoring Periodic Controlled Substance Monitoring   6/12/2023   2:24 PM  No signs of potential drug abuse or diversion identified.

## 2024-06-18 NOTE — ASSESSMENT & PLAN NOTE
Well-controlled, continue current medications  Treatment regimen is effective.   .  Lab Results   Component Value Date/Time     06/12/2024 09:59 AM    K 3.9 06/12/2024 09:59 AM     06/12/2024 09:59 AM    CO2 25 06/12/2024 09:59 AM    BUN 13 06/12/2024 09:59 AM    CREATININE 0.83 06/12/2024 09:59 AM    GLUCOSE 101 06/12/2024 09:59 AM    CALCIUM 9.7 06/12/2024 09:59 AM    LABGLOM 73 06/12/2024 09:59 AM    LABGLOM >60 12/12/2023 11:10 AM      Hypertension Medications       ACE Inhibitors       ramipril (ALTACE) 10 MG capsule Take 1 capsule by mouth daily      Calcium Channel Blockers       amLODIPine (NORVASC) 5 MG tablet Take 1 tablet by mouth 2 times daily      Loop Diuretics       furosemide (LASIX) 20 MG tablet Take 1 tablet by mouth daily as needed (leg swelling) 1/2 tablet (10 mg) daily ONLY AS NEEDED for leg swelling.

## 2024-06-18 NOTE — ASSESSMENT & PLAN NOTE
Well-controlled, continue current medications    Lab Results   Component Value Date    TSH 2.570 11/19/2021    BZH4ZFP 2.400 06/12/2024     Continue Synthroid 75 mcg daily

## 2024-06-18 NOTE — ASSESSMENT & PLAN NOTE
Well-controlled, continue current medications    Lab Results   Component Value Date    CHOL 149 06/12/2024    TRIG 117 06/12/2024    HDL 65 (H) 06/12/2024    LDL 61 06/12/2024    VLDL 23 06/12/2024    CHOLHDLRATIO 2.3 06/12/2024     Lipid Lowering Medications       HMG CoA Reductase Inhibitors       rosuvastatin (CRESTOR) 20 MG tablet Take 1 tablet by mouth at bedtime

## 2024-06-18 NOTE — ASSESSMENT & PLAN NOTE
Previously was seen by Dr. Nabila Huerta in 2017 for skin check up.  Patient requests referral for skin check and exam

## 2024-06-24 PROCEDURE — 93298 REM INTERROG DEV EVAL SCRMS: CPT | Performed by: INTERNAL MEDICINE

## 2024-08-20 ENCOUNTER — APPOINTMENT (RX ONLY)
Dept: URBAN - METROPOLITAN AREA CLINIC 329 | Facility: CLINIC | Age: 79
Setting detail: DERMATOLOGY
End: 2024-08-20

## 2024-08-20 DIAGNOSIS — L57.8 OTHER SKIN CHANGES DUE TO CHRONIC EXPOSURE TO NONIONIZING RADIATION: ICD-10-CM

## 2024-08-20 DIAGNOSIS — L81.4 OTHER MELANIN HYPERPIGMENTATION: ICD-10-CM

## 2024-08-20 DIAGNOSIS — D18.0 HEMANGIOMA: ICD-10-CM

## 2024-08-20 DIAGNOSIS — L82.1 OTHER SEBORRHEIC KERATOSIS: ICD-10-CM

## 2024-08-20 DIAGNOSIS — B07.8 OTHER VIRAL WARTS: ICD-10-CM

## 2024-08-20 DIAGNOSIS — D22 MELANOCYTIC NEVI: ICD-10-CM

## 2024-08-20 DIAGNOSIS — L81.8 OTHER SPECIFIED DISORDERS OF PIGMENTATION: ICD-10-CM

## 2024-08-20 PROBLEM — D22.5 MELANOCYTIC NEVI OF TRUNK: Status: ACTIVE | Noted: 2024-08-20

## 2024-08-20 PROBLEM — D18.01 HEMANGIOMA OF SKIN AND SUBCUTANEOUS TISSUE: Status: ACTIVE | Noted: 2024-08-20

## 2024-08-20 PROCEDURE — ? FULL BODY SKIN EXAM

## 2024-08-20 PROCEDURE — 17110 DESTRUCTION B9 LES UP TO 14: CPT

## 2024-08-20 PROCEDURE — ? LIQUID NITROGEN

## 2024-08-20 PROCEDURE — 99203 OFFICE O/P NEW LOW 30 MIN: CPT | Mod: 25

## 2024-08-20 PROCEDURE — ? SUNSCREEN RECOMMENDATIONS

## 2024-08-20 PROCEDURE — ? COUNSELING

## 2024-08-20 ASSESSMENT — LOCATION SIMPLE DESCRIPTION DERM
LOCATION SIMPLE: RIGHT UPPER BACK
LOCATION SIMPLE: UPPER BACK
LOCATION SIMPLE: RIGHT PRETIBIAL REGION
LOCATION SIMPLE: LEFT POSTERIOR UPPER ARM
LOCATION SIMPLE: LEFT UPPER BACK
LOCATION SIMPLE: CHEST

## 2024-08-20 ASSESSMENT — LOCATION DETAILED DESCRIPTION DERM
LOCATION DETAILED: RIGHT DISTAL PRETIBIAL REGION
LOCATION DETAILED: LEFT MEDIAL UPPER BACK
LOCATION DETAILED: LEFT DISTAL POSTERIOR UPPER ARM
LOCATION DETAILED: LEFT INFERIOR UPPER BACK
LOCATION DETAILED: LEFT MEDIAL SUPERIOR CHEST
LOCATION DETAILED: RIGHT MEDIAL UPPER BACK
LOCATION DETAILED: INFERIOR THORACIC SPINE

## 2024-08-20 ASSESSMENT — LOCATION ZONE DERM
LOCATION ZONE: LEG
LOCATION ZONE: ARM
LOCATION ZONE: TRUNK

## 2024-09-06 PROCEDURE — 93298 REM INTERROG DEV EVAL SCRMS: CPT | Performed by: INTERNAL MEDICINE

## 2024-11-08 NOTE — PROGRESS NOTES
[facility] CARDIOLOGY  [address] [place] [address], [address]  [place] [address]  PHONE: [phone]      24    NAME:  Cari Hook  : 1945  MRN: [id number]         SUBJECTIVE:   Cari Hook is a 79 y.o. female seen for a follow up visit regarding the following:     Chief Complaint   Patient presents with    Atrial Fibrillation    Transient Ischemic Attack            HPI:  Follow up  Atrial Fibrillation and Transient Ischemic Attack   .    Follow upafib, prior stroke, loop recorder.  No palpitations, cp, or dyspnea.  Walks about 3-4 days a week, about 20-30.  Notes some fatigue.  Admits she doesn't drink very much water, mostly coffee during the day.  She asks about some color change on her lower legs.  Has some dependent edema but goes down with leg elevation.  On her most recent labs, fasting serum glucose 101.        PAST CARDIAC HISTORY:    - breast cancer, lumpectomy only, arimidex   2021 - TIA manifesting with dysarthria   Brain MRI and CT no acute abnormality   MRA no large vessel or intracranial stenosis   21 - Echo EF 55-60%, impaired relaxation, negative bubble study   Dec 2021-  day monitor - NSR, ST, rare ectopic, 4 beats nsvt, 16 beats PAT, asymptomatic, no symptoms reported.    2022- ILR implanted  ~2022       Afib - rate control/anticoagulated              Cardiac Medications       ACE Inhibitors       ramipril (ALTACE) 10 MG capsule Take 1 capsule by mouth daily       Calcium Channel Blockers       amLODIPine (NORVASC) 5 MG tablet Take 1 tablet by mouth 2 times daily       Loop Diuretics       furosemide (LASIX) 20 MG tablet Take 1 tablet by mouth daily as needed (leg swelling) 1/2 tablet (10 mg) daily ONLY AS NEEDED for leg swelling.       HMG CoA Reductase Inhibitors       rosuvastatin (CRESTOR) 20 MG tablet Take 1 tablet by mouth at bedtime       Direct Factor Xa Inhibitors       apixaban (ELIQUIS) 5 MG TABS tablet Take 1 tablet by mouth 2 times

## 2024-11-11 ENCOUNTER — OFFICE VISIT (OUTPATIENT)
Age: 79
End: 2024-11-11
Payer: MEDICARE

## 2024-11-11 VITALS
HEIGHT: 70 IN | BODY MASS INDEX: 23.77 KG/M2 | DIASTOLIC BLOOD PRESSURE: 72 MMHG | HEART RATE: 63 BPM | WEIGHT: 166 LBS | SYSTOLIC BLOOD PRESSURE: 138 MMHG

## 2024-11-11 DIAGNOSIS — I48.0 PAROXYSMAL ATRIAL FIBRILLATION (HCC): Primary | ICD-10-CM

## 2024-11-11 DIAGNOSIS — I10 ESSENTIAL HYPERTENSION WITH GOAL BLOOD PRESSURE LESS THAN 130/85: ICD-10-CM

## 2024-11-11 DIAGNOSIS — G45.9 TIA (TRANSIENT ISCHEMIC ATTACK): ICD-10-CM

## 2024-11-11 PROCEDURE — 93000 ELECTROCARDIOGRAM COMPLETE: CPT | Performed by: INTERNAL MEDICINE

## 2024-11-11 PROCEDURE — 1036F TOBACCO NON-USER: CPT | Performed by: INTERNAL MEDICINE

## 2024-11-11 PROCEDURE — G8484 FLU IMMUNIZE NO ADMIN: HCPCS | Performed by: INTERNAL MEDICINE

## 2024-11-11 PROCEDURE — G8427 DOCREV CUR MEDS BY ELIG CLIN: HCPCS | Performed by: INTERNAL MEDICINE

## 2024-11-11 PROCEDURE — 1159F MED LIST DOCD IN RCRD: CPT | Performed by: INTERNAL MEDICINE

## 2024-11-11 PROCEDURE — 3075F SYST BP GE 130 - 139MM HG: CPT | Performed by: INTERNAL MEDICINE

## 2024-11-11 PROCEDURE — G8399 PT W/DXA RESULTS DOCUMENT: HCPCS | Performed by: INTERNAL MEDICINE

## 2024-11-11 PROCEDURE — 99214 OFFICE O/P EST MOD 30 MIN: CPT | Performed by: INTERNAL MEDICINE

## 2024-11-11 PROCEDURE — 1160F RVW MEDS BY RX/DR IN RCRD: CPT | Performed by: INTERNAL MEDICINE

## 2024-11-11 PROCEDURE — G8420 CALC BMI NORM PARAMETERS: HCPCS | Performed by: INTERNAL MEDICINE

## 2024-11-11 PROCEDURE — 1090F PRES/ABSN URINE INCON ASSESS: CPT | Performed by: INTERNAL MEDICINE

## 2024-11-11 PROCEDURE — 1123F ACP DISCUSS/DSCN MKR DOCD: CPT | Performed by: INTERNAL MEDICINE

## 2024-11-11 PROCEDURE — 1126F AMNT PAIN NOTED NONE PRSNT: CPT | Performed by: INTERNAL MEDICINE

## 2024-11-11 PROCEDURE — 3078F DIAST BP <80 MM HG: CPT | Performed by: INTERNAL MEDICINE

## 2024-11-11 ASSESSMENT — ENCOUNTER SYMPTOMS: SHORTNESS OF BREATH: 0

## 2024-11-18 ENCOUNTER — TELEPHONE (OUTPATIENT)
Age: 79
End: 2024-11-18

## 2024-11-18 DIAGNOSIS — I48.0 PAROXYSMAL ATRIAL FIBRILLATION (HCC): ICD-10-CM

## 2024-11-18 NOTE — TELEPHONE ENCOUNTER
MEDICATION REFILL REQUEST      Name of Medication:  Apixaban  Dose:  5 mg  Frequency:  BID  Quantity:  180  Days' supply:  90 with 3 refills    Pharmacy Name/Location:  [facility] pt and said they still have not received a rx from us,Please fax in RX and also call pt to let her know the Details

## 2024-12-05 ENCOUNTER — TELEPHONE (OUTPATIENT)
Dept: INTERNAL MEDICINE CLINIC | Facility: CLINIC | Age: 79
End: 2024-12-05

## 2024-12-05 NOTE — TELEPHONE ENCOUNTER
----- Message from Adela JENKINS sent at 12/5/2024 11:25 AM EST -----  Regarding: E[facility] Appointment Request  E[facility] Appointment Request    Patient needs appointment for ECC Appointment Type: Annual Visit.    Patient Requested Dates(s): First availability after December 19, 2024  Patient Requested Time: NA  Provider Name: Panchito Ghosh MD      Reason for Appointment Request: Established Patient - Available appointments did not meet patient need/ Patient has an appointment on December 19, 2024 at 11:20 AM, due to her granddaughter's college graduation. Patient wanted to reschedule but the soonest available doesn't match her to her needs.  --------------------------------------------------------------------------------------------------------------------------    Relationship to Patient: Self     Call Back Information: OK to leave message on voicemail  Preferred Call Back Number: Phone [phone]

## 2024-12-08 PROCEDURE — 93298 REM INTERROG DEV EVAL SCRMS: CPT | Performed by: INTERNAL MEDICINE

## 2024-12-12 ENCOUNTER — LAB (OUTPATIENT)
Dept: INTERNAL MEDICINE CLINIC | Facility: CLINIC | Age: 79
End: 2024-12-12

## 2024-12-12 DIAGNOSIS — E03.9 ACQUIRED HYPOTHYROIDISM: ICD-10-CM

## 2024-12-12 DIAGNOSIS — I10 PRIMARY HYPERTENSION: ICD-10-CM

## 2024-12-12 DIAGNOSIS — E78.5 DYSLIPIDEMIA: ICD-10-CM

## 2024-12-12 DIAGNOSIS — I48.0 PAROXYSMAL ATRIAL FIBRILLATION (HCC): ICD-10-CM

## 2024-12-12 LAB
ALBUMIN SERPL-MCNC: 4.4 G/DL (ref 3.2–4.6)
ALBUMIN/GLOB SERPL: 1.4 (ref 1–1.9)
ALP SERPL-CCNC: 96 U/L (ref 35–104)
ALT SERPL-CCNC: 26 U/L (ref 8–45)
ANION GAP SERPL CALC-SCNC: 11 MMOL/L (ref 7–16)
APPEARANCE UR: CLEAR
AST SERPL-CCNC: 27 U/L (ref 15–37)
BACTERIA URNS QL MICRO: NEGATIVE /HPF
BASOPHILS # BLD: 0 K/UL (ref 0–0.2)
BASOPHILS NFR BLD: 0 % (ref 0–2)
BILIRUB SERPL-MCNC: 0.8 MG/DL (ref 0–1.2)
BILIRUB UR QL: NEGATIVE
BUN SERPL-MCNC: 12 MG/DL (ref 8–23)
CALCIUM SERPL-MCNC: 10 MG/DL (ref 8.8–10.2)
CHLORIDE SERPL-SCNC: 102 MMOL/L (ref 98–107)
CHOLEST SERPL-MCNC: 162 MG/DL (ref 0–200)
CO2 SERPL-SCNC: 27 MMOL/L (ref 20–29)
COLOR UR: ABNORMAL
CREAT SERPL-MCNC: 1 MG/DL (ref 0.6–1.1)
DIFFERENTIAL METHOD BLD: ABNORMAL
EOSINOPHIL # BLD: 0.4 K/UL (ref 0–0.8)
EOSINOPHIL NFR BLD: 5 % (ref 0.5–7.8)
EPI CELLS #/AREA URNS HPF: ABNORMAL /HPF (ref 0–5)
ERYTHROCYTE [DISTWIDTH] IN BLOOD BY AUTOMATED COUNT: 14.4 % (ref 11.9–14.6)
GLOBULIN SER CALC-MCNC: 3.1 G/DL (ref 2.3–3.5)
GLUCOSE SERPL-MCNC: 118 MG/DL (ref 70–99)
GLUCOSE UR STRIP.AUTO-MCNC: NEGATIVE MG/DL
HCT VFR BLD AUTO: 46.7 % (ref 35.8–46.3)
HDLC SERPL-MCNC: 67 MG/DL (ref 40–60)
HDLC SERPL: 2.4 (ref 0–5)
HGB BLD-MCNC: 15.1 G/DL (ref 11.7–15.4)
HGB UR QL STRIP: ABNORMAL
HYALINE CASTS URNS QL MICRO: ABNORMAL /LPF
IMM GRANULOCYTES # BLD AUTO: 0 K/UL (ref 0–0.5)
IMM GRANULOCYTES NFR BLD AUTO: 0 % (ref 0–5)
KETONES UR QL STRIP.AUTO: NEGATIVE MG/DL
LDLC SERPL CALC-MCNC: 69 MG/DL (ref 0–100)
LEUKOCYTE ESTERASE UR QL STRIP.AUTO: NEGATIVE
LYMPHOCYTES # BLD: 2.4 K/UL (ref 0.5–4.6)
LYMPHOCYTES NFR BLD: 35 % (ref 13–44)
MCH RBC QN AUTO: 29.4 PG (ref 26.1–32.9)
MCHC RBC AUTO-ENTMCNC: 32.3 G/DL (ref 31.4–35)
MCV RBC AUTO: 91 FL (ref 82–102)
MONOCYTES # BLD: 0.5 K/UL (ref 0.1–1.3)
MONOCYTES NFR BLD: 7 % (ref 4–12)
NEUTS SEG # BLD: 3.6 K/UL (ref 1.7–8.2)
NEUTS SEG NFR BLD: 53 % (ref 43–78)
NITRITE UR QL STRIP.AUTO: NEGATIVE
NRBC # BLD: 0 K/UL (ref 0–0.2)
PH UR STRIP: 7 (ref 5–9)
PLATELET # BLD AUTO: 283 K/UL (ref 150–450)
PMV BLD AUTO: 9.8 FL (ref 9.4–12.3)
POTASSIUM SERPL-SCNC: 4.4 MMOL/L (ref 3.5–5.1)
PROT SERPL-MCNC: 7.4 G/DL (ref 6.3–8.2)
PROT UR STRIP-MCNC: NEGATIVE MG/DL
RBC # BLD AUTO: 5.13 M/UL (ref 4.05–5.2)
RBC #/AREA URNS HPF: ABNORMAL /HPF (ref 0–5)
SODIUM SERPL-SCNC: 141 MMOL/L (ref 136–145)
SP GR UR REFRACTOMETRY: 1.01 (ref 1–1.02)
TRIGL SERPL-MCNC: 129 MG/DL (ref 0–150)
TSH, 3RD GENERATION: 4.92 UIU/ML (ref 0.27–4.2)
UROBILINOGEN UR QL STRIP.AUTO: 0.2 EU/DL (ref 0.2–1)
VLDLC SERPL CALC-MCNC: 26 MG/DL (ref 6–23)
WBC # BLD AUTO: 6.9 K/UL (ref 4.3–11.1)
WBC URNS QL MICRO: ABNORMAL /HPF (ref 0–4)

## 2025-01-06 SDOH — HEALTH STABILITY: PHYSICAL HEALTH: ON AVERAGE, HOW MANY DAYS PER WEEK DO YOU ENGAGE IN MODERATE TO STRENUOUS EXERCISE (LIKE A BRISK WALK)?: 3 DAYS

## 2025-01-06 SDOH — HEALTH STABILITY: PHYSICAL HEALTH: ON AVERAGE, HOW MANY MINUTES DO YOU ENGAGE IN EXERCISE AT THIS LEVEL?: 20 MIN

## 2025-01-06 SDOH — ECONOMIC STABILITY: FOOD INSECURITY: WITHIN THE PAST 12 MONTHS, THE FOOD YOU BOUGHT JUST DIDN'T LAST AND YOU DIDN'T HAVE MONEY TO GET MORE.: NEVER TRUE

## 2025-01-06 SDOH — ECONOMIC STABILITY: FOOD INSECURITY: WITHIN THE PAST 12 MONTHS, YOU WORRIED THAT YOUR FOOD WOULD RUN OUT BEFORE YOU GOT MONEY TO BUY MORE.: NEVER TRUE

## 2025-01-06 SDOH — ECONOMIC STABILITY: INCOME INSECURITY: HOW HARD IS IT FOR YOU TO PAY FOR THE VERY BASICS LIKE FOOD, HOUSING, MEDICAL CARE, AND HEATING?: NOT HARD AT ALL

## 2025-01-06 SDOH — ECONOMIC STABILITY: TRANSPORTATION INSECURITY
IN THE PAST 12 MONTHS, HAS LACK OF TRANSPORTATION KEPT YOU FROM MEETINGS, WORK, OR FROM GETTING THINGS NEEDED FOR DAILY LIVING?: NO

## 2025-01-06 ASSESSMENT — PATIENT HEALTH QUESTIONNAIRE - PHQ9
1. LITTLE INTEREST OR PLEASURE IN DOING THINGS: NOT AT ALL
SUM OF ALL RESPONSES TO PHQ QUESTIONS 1-9: 0
SUM OF ALL RESPONSES TO PHQ QUESTIONS 1-9: 0
2. FEELING DOWN, DEPRESSED OR HOPELESS: NOT AT ALL
SUM OF ALL RESPONSES TO PHQ9 QUESTIONS 1 & 2: 0
SUM OF ALL RESPONSES TO PHQ QUESTIONS 1-9: 0
SUM OF ALL RESPONSES TO PHQ QUESTIONS 1-9: 0

## 2025-01-06 ASSESSMENT — LIFESTYLE VARIABLES
HOW OFTEN DO YOU HAVE A DRINK CONTAINING ALCOHOL: MONTHLY OR LESS
HOW MANY STANDARD DRINKS CONTAINING ALCOHOL DO YOU HAVE ON A TYPICAL DAY: 1
HOW OFTEN DO YOU HAVE A DRINK CONTAINING ALCOHOL: 2
HOW MANY STANDARD DRINKS CONTAINING ALCOHOL DO YOU HAVE ON A TYPICAL DAY: 1 OR 2
HOW OFTEN DO YOU HAVE SIX OR MORE DRINKS ON ONE OCCASION: 1

## 2025-01-07 ENCOUNTER — OFFICE VISIT (OUTPATIENT)
Dept: INTERNAL MEDICINE CLINIC | Facility: CLINIC | Age: 80
End: 2025-01-07
Payer: MEDICARE

## 2025-01-07 VITALS
OXYGEN SATURATION: 97 % | BODY MASS INDEX: 24.05 KG/M2 | HEIGHT: 70 IN | TEMPERATURE: 97.9 F | HEART RATE: 74 BPM | DIASTOLIC BLOOD PRESSURE: 76 MMHG | SYSTOLIC BLOOD PRESSURE: 130 MMHG | WEIGHT: 168 LBS

## 2025-01-07 DIAGNOSIS — I48.0 PAROXYSMAL ATRIAL FIBRILLATION (HCC): ICD-10-CM

## 2025-01-07 DIAGNOSIS — Z00.00 MEDICARE ANNUAL WELLNESS VISIT, SUBSEQUENT: Primary | ICD-10-CM

## 2025-01-07 DIAGNOSIS — B35.1 ONYCHOMYCOSIS: ICD-10-CM

## 2025-01-07 DIAGNOSIS — E03.9 ACQUIRED HYPOTHYROIDISM: ICD-10-CM

## 2025-01-07 DIAGNOSIS — I10 ESSENTIAL HYPERTENSION: ICD-10-CM

## 2025-01-07 DIAGNOSIS — E78.2 MIXED HYPERLIPIDEMIA: ICD-10-CM

## 2025-01-07 DIAGNOSIS — G47.00 INSOMNIA, UNSPECIFIED TYPE: ICD-10-CM

## 2025-01-07 DIAGNOSIS — Z86.73 HISTORY OF TIA (TRANSIENT ISCHEMIC ATTACK): ICD-10-CM

## 2025-01-07 DIAGNOSIS — Z79.01 LONG TERM CURRENT USE OF ANTICOAGULANT: ICD-10-CM

## 2025-01-07 PROCEDURE — M1308 PR FLU IMMUNIZE NO ADMIN: HCPCS | Performed by: PHYSICIAN ASSISTANT

## 2025-01-07 PROCEDURE — G8420 CALC BMI NORM PARAMETERS: HCPCS | Performed by: PHYSICIAN ASSISTANT

## 2025-01-07 PROCEDURE — 1159F MED LIST DOCD IN RCRD: CPT | Performed by: PHYSICIAN ASSISTANT

## 2025-01-07 PROCEDURE — G8399 PT W/DXA RESULTS DOCUMENT: HCPCS | Performed by: PHYSICIAN ASSISTANT

## 2025-01-07 PROCEDURE — 3075F SYST BP GE 130 - 139MM HG: CPT | Performed by: PHYSICIAN ASSISTANT

## 2025-01-07 PROCEDURE — G0439 PPPS, SUBSEQ VISIT: HCPCS | Performed by: PHYSICIAN ASSISTANT

## 2025-01-07 PROCEDURE — 99214 OFFICE O/P EST MOD 30 MIN: CPT | Performed by: PHYSICIAN ASSISTANT

## 2025-01-07 PROCEDURE — 3078F DIAST BP <80 MM HG: CPT | Performed by: PHYSICIAN ASSISTANT

## 2025-01-07 PROCEDURE — 1090F PRES/ABSN URINE INCON ASSESS: CPT | Performed by: PHYSICIAN ASSISTANT

## 2025-01-07 PROCEDURE — 1123F ACP DISCUSS/DSCN MKR DOCD: CPT | Performed by: PHYSICIAN ASSISTANT

## 2025-01-07 PROCEDURE — 1036F TOBACCO NON-USER: CPT | Performed by: PHYSICIAN ASSISTANT

## 2025-01-07 PROCEDURE — G8427 DOCREV CUR MEDS BY ELIG CLIN: HCPCS | Performed by: PHYSICIAN ASSISTANT

## 2025-01-07 RX ORDER — ZOLPIDEM TARTRATE 5 MG/1
5 TABLET ORAL NIGHTLY PRN
Qty: 30 TABLET | Refills: 0 | Status: SHIPPED | OUTPATIENT
Start: 2025-01-07 | End: 2025-02-06

## 2025-01-07 RX ORDER — ZOLPIDEM TARTRATE 5 MG/1
5 TABLET ORAL NIGHTLY PRN
Qty: 30 TABLET | Status: CANCELLED | OUTPATIENT
Start: 2025-01-07 | End: 2025-02-06

## 2025-01-07 RX ORDER — ROSUVASTATIN CALCIUM 20 MG/1
20 TABLET, COATED ORAL NIGHTLY
Qty: 90 TABLET | Refills: 3 | Status: SHIPPED | OUTPATIENT
Start: 2025-01-07

## 2025-01-07 RX ORDER — RAMIPRIL 10 MG/1
10 CAPSULE ORAL DAILY
Qty: 90 CAPSULE | Refills: 3 | Status: SHIPPED | OUTPATIENT
Start: 2025-01-07

## 2025-01-07 RX ORDER — ZOLPIDEM TARTRATE 5 MG/1
5 TABLET ORAL NIGHTLY PRN
COMMUNITY
End: 2025-01-07 | Stop reason: SDUPTHER

## 2025-01-07 RX ORDER — CICLOPIROX 80 MG/ML
SOLUTION TOPICAL
Qty: 6 ML | Refills: 0 | Status: SHIPPED | OUTPATIENT
Start: 2025-01-07

## 2025-01-07 NOTE — PROGRESS NOTES
(ARIMIDEX) 1 MG tablet Take 1 tablet by mouth daily Yes Panchito Ghosh MD   amLODIPine (NORVASC) 5 MG tablet Take 1 tablet by mouth 2 times daily Yes Panchito Ghosh MD   oxyBUTYnin (DITROPAN) 5 MG tablet Take 1 tablet by mouth 2 times daily Yes Panchito Ghosh MD   levothyroxine (SYNTHROID) 75 MCG tablet Take 1 tablet by mouth every morning (before breakfast) Yes Panchito Ghosh MD   amitriptyline (ELAVIL) 50 MG tablet Take 1 tablet by mouth nightly Yes Panchito Ghosh MD   furosemide (LASIX) 20 MG tablet Take 1 tablet by mouth daily as needed (leg swelling) 1/2 tablet (10 mg) daily ONLY AS NEEDED for leg swelling. Yes Imani Garcia APRN - CNP   diclofenac sodium (VOLTAREN) 1 % GEL Apply 2 g topically 3 times daily as needed Yes Automatic Reconciliation, Ar   ibuprofen (ADVIL;MOTRIN) 600 MG tablet Take 1 tablet by mouth every 6 hours as needed Yes Automatic Reconciliation, Ar       CareTeam (Including outside providers/suppliers regularly involved in providing care):   Patient Care Team:  Panchito Ghosh MD as PCP - General  Panchito Ghosh MD as PCP - Empaneled Provider  Amandeep Russell MD as Surgeon     Recommendations for Preventive Services Due: see orders and patient instructions/AVS.  Recommended screening schedule for the next 5-10 years is provided to the patient in written form: see Patient Instructions/AVS.     Reviewed and updated this visit:  Tobacco  Allergies  Meds  Med Hx  Surg Hx  Soc Hx  Fam Hx

## 2025-02-08 PROCEDURE — 93298 REM INTERROG DEV EVAL SCRMS: CPT | Performed by: INTERNAL MEDICINE

## 2025-02-11 ENCOUNTER — OFFICE VISIT (OUTPATIENT)
Dept: AUDIOLOGY | Age: 80
End: 2025-02-11
Payer: MEDICARE

## 2025-02-11 ENCOUNTER — OFFICE VISIT (OUTPATIENT)
Dept: ENT CLINIC | Age: 80
End: 2025-02-11
Payer: MEDICARE

## 2025-02-11 VITALS
SYSTOLIC BLOOD PRESSURE: 128 MMHG | DIASTOLIC BLOOD PRESSURE: 76 MMHG | BODY MASS INDEX: 24.65 KG/M2 | HEIGHT: 70 IN | WEIGHT: 172.2 LBS

## 2025-02-11 DIAGNOSIS — H90.3 SENSORINEURAL HEARING LOSS, BILATERAL: Primary | ICD-10-CM

## 2025-02-11 DIAGNOSIS — H61.23 BILATERAL IMPACTED CERUMEN: Chronic | ICD-10-CM

## 2025-02-11 DIAGNOSIS — H90.3 SENSORINEURAL HEARING LOSS (SNHL) OF BOTH EARS: Primary | Chronic | ICD-10-CM

## 2025-02-11 PROCEDURE — 1123F ACP DISCUSS/DSCN MKR DOCD: CPT | Performed by: PHYSICIAN ASSISTANT

## 2025-02-11 PROCEDURE — 1160F RVW MEDS BY RX/DR IN RCRD: CPT | Performed by: PHYSICIAN ASSISTANT

## 2025-02-11 PROCEDURE — 92557 COMPREHENSIVE HEARING TEST: CPT | Performed by: AUDIOLOGIST

## 2025-02-11 PROCEDURE — G8399 PT W/DXA RESULTS DOCUMENT: HCPCS | Performed by: PHYSICIAN ASSISTANT

## 2025-02-11 PROCEDURE — 69210 REMOVE IMPACTED EAR WAX UNI: CPT | Performed by: PHYSICIAN ASSISTANT

## 2025-02-11 PROCEDURE — G8427 DOCREV CUR MEDS BY ELIG CLIN: HCPCS | Performed by: PHYSICIAN ASSISTANT

## 2025-02-11 PROCEDURE — G8420 CALC BMI NORM PARAMETERS: HCPCS | Performed by: PHYSICIAN ASSISTANT

## 2025-02-11 PROCEDURE — 92567 TYMPANOMETRY: CPT | Performed by: AUDIOLOGIST

## 2025-02-11 PROCEDURE — 1126F AMNT PAIN NOTED NONE PRSNT: CPT | Performed by: PHYSICIAN ASSISTANT

## 2025-02-11 PROCEDURE — 3078F DIAST BP <80 MM HG: CPT | Performed by: PHYSICIAN ASSISTANT

## 2025-02-11 PROCEDURE — 3074F SYST BP LT 130 MM HG: CPT | Performed by: PHYSICIAN ASSISTANT

## 2025-02-11 PROCEDURE — 1159F MED LIST DOCD IN RCRD: CPT | Performed by: PHYSICIAN ASSISTANT

## 2025-02-11 PROCEDURE — 1036F TOBACCO NON-USER: CPT | Performed by: PHYSICIAN ASSISTANT

## 2025-02-11 PROCEDURE — 1090F PRES/ABSN URINE INCON ASSESS: CPT | Performed by: PHYSICIAN ASSISTANT

## 2025-02-11 PROCEDURE — 99213 OFFICE O/P EST LOW 20 MIN: CPT | Performed by: PHYSICIAN ASSISTANT

## 2025-02-11 ASSESSMENT — ENCOUNTER SYMPTOMS
ALLERGIC/IMMUNOLOGIC NEGATIVE: 1
EYES NEGATIVE: 1
RESPIRATORY NEGATIVE: 1
GASTROINTESTINAL NEGATIVE: 1

## 2025-02-11 NOTE — PROGRESS NOTES
History of Present Illness  The patient is a 79-year-old female presenting today for a recheck and annual hearing test.    She reports no significant changes in her auditory perception over the past year. She feels good overall and just checking in on her hearing status.     Audiogram:   Right ear: normal sloping to severe SNHL  Left ear:  normal sloping to severe SNHL  Discrimination score: Right ear 100 % and Left ear 100 %   Tympanogram: Right ear Type A and Left ear Type A.       Chief Complaint   Patient presents with    Follow-up     1 year with audio.  Feels like her hearing has stayed the same.  Denies tinnitus.  Denies pain, drainage, and itching.  No other complaints at this time.         Patient Active Problem List   Diagnosis    Essential hypertension with goal blood pressure less than 130/85    TIA (transient ischemic attack)    Ductal carcinoma in situ (DCIS) of right breast    Hypertension    Chronic insomnia    Dyslipidemia    Family history of breast cancer in first degree relative    OAB (overactive bladder)    Hypothyroidism    Paroxysmal atrial fibrillation (HCC)    RLS (restless legs syndrome)    Skin abnormalities        Reviewed and updated this visit by provider:  Tobacco  Allergies  Meds  Problems  Med Hx  Surg Hx  Fam Hx         Review of Systems   Constitutional: Negative.    HENT:  Positive for hearing loss. Negative for ear discharge, ear pain and tinnitus.    Eyes: Negative.    Respiratory: Negative.     Cardiovascular: Negative.    Gastrointestinal: Negative.    Endocrine: Negative.    Genitourinary: Negative.    Musculoskeletal: Negative.    Skin: Negative.    Allergic/Immunologic: Negative.    Neurological: Negative.    Hematological: Negative.    Psychiatric/Behavioral: Negative.          /76 (Site: Right Upper Arm, Position: Sitting)   Ht 1.778 m (5' 10\")   Wt 78.1 kg (172 lb 3.2 oz)   BMI 24.71 kg/m²   Physical Exam:    Adult physical     General: Well developed,

## 2025-02-11 NOTE — PROGRESS NOTES
AUDIOLOGY EVALUATION    Cari Hook had Tympanometry and Audiometry performed today.    The patient reports hearing loss and tinnitus.    Results as follows:    Tympanometry    Type A -  bilaterally    Audiometry    Test Performed - Comprehensive Audiogram    Type of Loss - Right Ear: abnormal hearing: degree of loss is normal to moderately severe sensorineural hearing loss                           Left Ear: abnormal hearing: degree of loss is normal to moderately severe sensorineural hearing loss     SRT   Measurement Right Ear Left Ear   Value 20 20   Unit dB dB     Discrimination  Measurement Right Ear Left Ear   Value 100% 100%   Unit dB dB     Recommend  Binaural amplification   Annual audios    Erika Fagan, CCC-A

## 2025-03-17 ENCOUNTER — HOSPITAL ENCOUNTER (OUTPATIENT)
Dept: MAMMOGRAPHY | Age: 80
Discharge: HOME | End: 2025-03-20
Attending: INTERNAL MEDICINE
Payer: MEDICARE

## 2025-03-17 ENCOUNTER — TRANSCRIBE ORDERS (OUTPATIENT)
Dept: SCHEDULING | Age: 80
End: 2025-03-17

## 2025-03-17 DIAGNOSIS — Z12.31 OTHER SCREENING MAMMOGRAM: Primary | ICD-10-CM

## 2025-03-17 DIAGNOSIS — Z12.31 OTHER SCREENING MAMMOGRAM: ICD-10-CM

## 2025-03-17 PROCEDURE — 77063 BREAST TOMOSYNTHESIS BI: CPT

## 2025-03-24 ENCOUNTER — RESULTS FOLLOW-UP (OUTPATIENT)
Dept: INTERNAL MEDICINE CLINIC | Facility: CLINIC | Age: 80
End: 2025-03-24

## 2025-04-15 ENCOUNTER — LAB (OUTPATIENT)
Dept: INTERNAL MEDICINE CLINIC | Facility: CLINIC | Age: 80
End: 2025-04-15

## 2025-04-15 DIAGNOSIS — E03.9 ACQUIRED HYPOTHYROIDISM: ICD-10-CM

## 2025-04-15 LAB — TSH W FREE THYROID IF ABNORMAL: 3.45 UIU/ML (ref 0.27–4.2)

## 2025-06-06 ENCOUNTER — TELEPHONE (OUTPATIENT)
Dept: INTERNAL MEDICINE CLINIC | Facility: CLINIC | Age: 80
End: 2025-06-06

## 2025-06-06 DIAGNOSIS — E03.9 ACQUIRED HYPOTHYROIDISM: ICD-10-CM

## 2025-06-06 DIAGNOSIS — I10 ESSENTIAL HYPERTENSION: ICD-10-CM

## 2025-06-06 DIAGNOSIS — E03.9 ACQUIRED HYPOTHYROIDISM: Primary | ICD-10-CM

## 2025-06-06 DIAGNOSIS — E78.2 MIXED HYPERLIPIDEMIA: ICD-10-CM

## 2025-06-06 RX ORDER — LEVOTHYROXINE SODIUM 75 UG/1
75 TABLET ORAL
Qty: 90 TABLET | Refills: 3 | OUTPATIENT
Start: 2025-06-06

## 2025-06-06 NOTE — TELEPHONE ENCOUNTER
----- Message from Rosi DOVE sent at 6/6/2025 12:41 PM EDT -----  Regarding: ECC Message to Provider  ECC Message to Provider    Relationship to Patient: Self     Additional Information : Patient is asking if she needs a lab work done before her appointment on July 8, 2025 at 1:20 pm  --------------------------------------------------------------------------------------------------------------------------    Call Back Information: OK to leave message on voicemail  Preferred Call Back Number: Phone [phone]

## 2025-07-01 ENCOUNTER — LAB (OUTPATIENT)
Dept: INTERNAL MEDICINE CLINIC | Facility: CLINIC | Age: 80
End: 2025-07-01

## 2025-07-01 DIAGNOSIS — E78.2 MIXED HYPERLIPIDEMIA: ICD-10-CM

## 2025-07-01 DIAGNOSIS — I10 ESSENTIAL HYPERTENSION: ICD-10-CM

## 2025-07-01 DIAGNOSIS — E03.9 ACQUIRED HYPOTHYROIDISM: ICD-10-CM

## 2025-07-01 LAB
ALBUMIN SERPL-MCNC: 3.9 G/DL (ref 3.2–4.6)
ALBUMIN/GLOB SERPL: 1.3 (ref 1–1.9)
ALT SERPL-CCNC: 27 U/L (ref 8–45)
ANION GAP SERPL CALC-SCNC: 11 MMOL/L (ref 7–16)
AST SERPL-CCNC: 28 U/L (ref 15–37)
BASOPHILS # BLD: 0.04 K/UL (ref 0–0.2)
BASOPHILS NFR BLD: 0.6 % (ref 0–2)
BILIRUB SERPL-MCNC: 0.5 MG/DL (ref 0–1.2)
BUN SERPL-MCNC: 12 MG/DL (ref 8–23)
CALCIUM SERPL-MCNC: 9.8 MG/DL (ref 8.8–10.2)
CHLORIDE SERPL-SCNC: 105 MMOL/L (ref 98–107)
CHOLEST SERPL-MCNC: 150 MG/DL (ref 0–200)
CO2 SERPL-SCNC: 26 MMOL/L (ref 20–29)
CREAT SERPL-MCNC: 0.9 MG/DL (ref 0.6–1.1)
DIFFERENTIAL METHOD BLD: NORMAL
EOSINOPHIL # BLD: 0.48 K/UL (ref 0–0.8)
ERYTHROCYTE [DISTWIDTH] IN BLOOD BY AUTOMATED COUNT: 13.9 % (ref 11.9–14.6)
GLOBULIN SER CALC-MCNC: 2.9 G/DL (ref 2.3–3.5)
GLUCOSE SERPL-MCNC: 100 MG/DL (ref 70–99)
HCT VFR BLD AUTO: 46.2 % (ref 35.8–46.3)
HDLC SERPL-MCNC: 62 MG/DL (ref 40–60)
HDLC SERPL: 2.4 (ref 0–5)
HGB BLD-MCNC: 14.7 G/DL (ref 11.7–15.4)
IMM GRANULOCYTES # BLD AUTO: 0.01 K/UL (ref 0–0.5)
IMM GRANULOCYTES NFR BLD AUTO: 0.2 % (ref 0–5)
LDLC SERPL CALC-MCNC: 66 MG/DL (ref 0–100)
LYMPHOCYTES # BLD: 2.39 K/UL (ref 0.5–4.6)
LYMPHOCYTES NFR BLD: 38.1 % (ref 13–44)
MCH RBC QN AUTO: 29.6 PG (ref 26.1–32.9)
MCHC RBC AUTO-ENTMCNC: 31.8 G/DL (ref 31.4–35)
MCV RBC AUTO: 93.1 FL (ref 82–102)
MONOCYTES NFR BLD: 7.8 % (ref 4–12)
NEUTS SEG # BLD: 2.86 K/UL (ref 1.7–8.2)
NEUTS SEG NFR BLD: 45.6 % (ref 43–78)
NRBC # BLD: 0 K/UL (ref 0–0.2)
PLATELET # BLD AUTO: 227 K/UL (ref 150–450)
PMV BLD AUTO: 9.7 FL (ref 9.4–12.3)
POTASSIUM SERPL-SCNC: 4.2 MMOL/L (ref 3.5–5.1)
PROT SERPL-MCNC: 6.8 G/DL (ref 6.3–8.2)
RBC # BLD AUTO: 4.96 M/UL (ref 4.05–5.2)
SODIUM SERPL-SCNC: 141 MMOL/L (ref 136–145)
TRIGL SERPL-MCNC: 111 MG/DL (ref 0–150)
TSH, 3RD GENERATION: 1.41 UIU/ML (ref 0.27–4.2)
VLDLC SERPL CALC-MCNC: 22 MG/DL (ref 6–23)
WBC # BLD AUTO: 6.3 K/UL (ref 4.3–11.1)

## 2025-07-07 PROBLEM — R47.1 DYSARTHRIA: Status: ACTIVE | Noted: 2021-11-18

## 2025-07-07 PROBLEM — D05.11 DUCTAL CARCINOMA IN SITU (DCIS) OF RIGHT BREAST: Status: ACTIVE | Noted: 2019-12-30

## 2025-07-07 SDOH — ECONOMIC STABILITY: INCOME INSECURITY: IN THE LAST 12 MONTHS, WAS THERE A TIME WHEN YOU WERE NOT ABLE TO PAY THE MORTGAGE OR RENT ON TIME?: NO

## 2025-07-07 SDOH — ECONOMIC STABILITY: FOOD INSECURITY: WITHIN THE PAST 12 MONTHS, YOU WORRIED THAT YOUR FOOD WOULD RUN OUT BEFORE YOU GOT MONEY TO BUY MORE.: NEVER TRUE

## 2025-07-07 SDOH — ECONOMIC STABILITY: TRANSPORTATION INSECURITY
IN THE PAST 12 MONTHS, HAS THE LACK OF TRANSPORTATION KEPT YOU FROM MEDICAL APPOINTMENTS OR FROM GETTING MEDICATIONS?: NO

## 2025-07-07 SDOH — ECONOMIC STABILITY: FOOD INSECURITY: WITHIN THE PAST 12 MONTHS, THE FOOD YOU BOUGHT JUST DIDN'T LAST AND YOU DIDN'T HAVE MONEY TO GET MORE.: NEVER TRUE

## 2025-07-08 ENCOUNTER — OFFICE VISIT (OUTPATIENT)
Dept: INTERNAL MEDICINE CLINIC | Facility: CLINIC | Age: 80
End: 2025-07-08

## 2025-07-08 VITALS
SYSTOLIC BLOOD PRESSURE: 118 MMHG | HEART RATE: 71 BPM | WEIGHT: 172 LBS | BODY MASS INDEX: 24.62 KG/M2 | HEIGHT: 70 IN | OXYGEN SATURATION: 96 % | DIASTOLIC BLOOD PRESSURE: 76 MMHG | TEMPERATURE: 97.6 F

## 2025-07-08 DIAGNOSIS — E03.9 ACQUIRED HYPOTHYROIDISM: ICD-10-CM

## 2025-07-08 DIAGNOSIS — N32.81 OAB (OVERACTIVE BLADDER): ICD-10-CM

## 2025-07-08 DIAGNOSIS — I10 PRIMARY HYPERTENSION: Primary | ICD-10-CM

## 2025-07-08 DIAGNOSIS — I48.0 PAROXYSMAL ATRIAL FIBRILLATION (HCC): ICD-10-CM

## 2025-07-08 DIAGNOSIS — E78.2 MIXED DYSLIPIDEMIA: ICD-10-CM

## 2025-07-08 DIAGNOSIS — G47.00 INSOMNIA, UNSPECIFIED TYPE: ICD-10-CM

## 2025-07-08 DIAGNOSIS — E78.5 DYSLIPIDEMIA: ICD-10-CM

## 2025-07-08 DIAGNOSIS — D05.11 DUCTAL CARCINOMA IN SITU (DCIS) OF RIGHT BREAST: ICD-10-CM

## 2025-07-08 DIAGNOSIS — F51.04 CHRONIC INSOMNIA: ICD-10-CM

## 2025-07-08 DIAGNOSIS — G25.81 RLS (RESTLESS LEGS SYNDROME): ICD-10-CM

## 2025-07-08 DIAGNOSIS — E78.2 MIXED HYPERLIPIDEMIA: ICD-10-CM

## 2025-07-08 DIAGNOSIS — I10 ESSENTIAL HYPERTENSION: ICD-10-CM

## 2025-07-08 RX ORDER — RAMIPRIL 10 MG/1
10 CAPSULE ORAL DAILY
Qty: 90 CAPSULE | Refills: 3 | Status: SHIPPED | OUTPATIENT
Start: 2025-07-08

## 2025-07-08 RX ORDER — OXYBUTYNIN CHLORIDE 5 MG/1
5 TABLET ORAL 2 TIMES DAILY
Qty: 180 TABLET | Refills: 3 | Status: SHIPPED | OUTPATIENT
Start: 2025-07-08

## 2025-07-08 RX ORDER — AMLODIPINE BESYLATE 5 MG/1
5 TABLET ORAL 2 TIMES DAILY
Qty: 180 TABLET | Refills: 3 | Status: SHIPPED | OUTPATIENT
Start: 2025-07-08

## 2025-07-08 RX ORDER — ZOLPIDEM TARTRATE 5 MG/1
5 TABLET ORAL NIGHTLY PRN
Qty: 30 TABLET | Refills: 5 | Status: SHIPPED | OUTPATIENT
Start: 2025-07-08 | End: 2026-01-04

## 2025-07-08 RX ORDER — ROSUVASTATIN CALCIUM 20 MG/1
20 TABLET, COATED ORAL NIGHTLY
Qty: 90 TABLET | Refills: 3 | Status: SHIPPED | OUTPATIENT
Start: 2025-07-08

## 2025-07-08 RX ORDER — LEVOTHYROXINE SODIUM 75 UG/1
75 TABLET ORAL
Qty: 90 TABLET | Refills: 3 | Status: SHIPPED | OUTPATIENT
Start: 2025-07-08

## 2025-07-08 RX ORDER — AMITRIPTYLINE HYDROCHLORIDE 50 MG/1
50 TABLET ORAL NIGHTLY
Qty: 90 TABLET | Refills: 3 | Status: SHIPPED | OUTPATIENT
Start: 2025-07-08

## 2025-07-08 RX ORDER — ANASTROZOLE 1 MG/1
1 TABLET ORAL DAILY
Qty: 90 TABLET | Refills: 3 | Status: SHIPPED | OUTPATIENT
Start: 2025-07-08

## 2025-07-08 NOTE — ASSESSMENT & PLAN NOTE
Well-controlled, continue current medications    Lab Results   Component Value Date    TSH 1.410 07/01/2025    TSHELE 3.45 04/15/2025     Continue Synthroid 75 mcg daily

## 2025-07-08 NOTE — PROGRESS NOTES
ASSESSMENT/PLAN:  Controlled Substance Monitoring:    Acute and Chronic Pain Monitoring:   RX Monitoring Periodic Controlled Substance Monitoring   7/8/2025   1:57 PM No signs of potential drug abuse or diversion identified.       Assessment & Plan     - Blood work results are normal, including electrolytes, cholesterol, thyroid function, complete blood count, liver function, and platelet counts.  -Treatment regimen is effective.     -Medication refilled    - Scheduled a follow-up visit in 6 months.      1. Primary hypertension  Assessment & Plan:   Well-controlled, continue current medications  Treatment regimen is effective.   .   Mild LE Lymphedema   - The edema is likely a side effect of amlodipine.  - Physical examination showed MINIMAL trace ankles. Consistent with what I typically will see with Amlodipne SE   - Advised against regular use of diuretics such as furosemide to avoid electrolyte imbalance and dehydration.  - Recommended using furosemide 10 mg only on rare occasions for comfort, such as after consuming a high-salt meal or due to prolonged inactivity, travel, etc.    Lab Results   Component Value Date/Time     07/01/2025 10:12 AM    K 4.2 07/01/2025 10:12 AM     07/01/2025 10:12 AM    CO2 26 07/01/2025 10:12 AM    BUN 12 07/01/2025 10:12 AM    CREATININE 0.90 07/01/2025 10:12 AM    GLUCOSE 100 07/01/2025 10:12 AM    CALCIUM 9.8 07/01/2025 10:12 AM    LABGLOM 66 07/01/2025 10:12 AM    LABGLOM >60 12/12/2023 11:10 AM      Hypertension Medications       ACE Inhibitors       ramipril (ALTACE) 10 MG capsule Take 1 capsule by mouth daily       Calcium Channel Blockers       amLODIPine (NORVASC) 5 MG tablet Take 1 tablet by mouth 2 times daily       Loop Diuretics       furosemide (LASIX) 20 MG tablet Take 1 tablet by mouth daily as needed (leg swelling) 1/2 tablet (10 mg) daily ONLY AS NEEDED for leg swelling.            Orders:  -     amLODIPine (NORVASC) 5 MG tablet; Take 1 tablet by

## 2025-07-08 NOTE — ASSESSMENT & PLAN NOTE
Well-controlled, continue current medications    Lab Results   Component Value Date    CHOL 150 07/01/2025    TRIG 111 07/01/2025    HDL 62 (H) 07/01/2025    LDL 66 07/01/2025    VLDL 22 07/01/2025    CHOLHDLRATIO 2.4 07/01/2025     Lipid Lowering Medications       HMG CoA Reductase Inhibitors       rosuvastatin (CRESTOR) 20 MG tablet Take 1 tablet by mouth at bedtime

## 2025-07-08 NOTE — ASSESSMENT & PLAN NOTE
Treatment regimen is effective.     Medication refilled    Controlled Substance Monitoring:    Acute and Chronic Pain Monitoring:   RX Monitoring Periodic Controlled Substance Monitoring   7/8/2025   1:57 PM No signs of potential drug abuse or diversion identified.

## 2025-07-08 NOTE — ASSESSMENT & PLAN NOTE
Well-controlled, continue current medications  Treatment regimen is effective.   .   Mild LE Lymphedema   - The edema is likely a side effect of amlodipine.  - Physical examination showed MINIMAL trace ankles. Consistent with what I typically will see with Amlodipne SE   - Advised against regular use of diuretics such as furosemide to avoid electrolyte imbalance and dehydration.  - Recommended using furosemide 10 mg only on rare occasions for comfort, such as after consuming a high-salt meal or due to prolonged inactivity, travel, etc.    Lab Results   Component Value Date/Time     07/01/2025 10:12 AM    K 4.2 07/01/2025 10:12 AM     07/01/2025 10:12 AM    CO2 26 07/01/2025 10:12 AM    BUN 12 07/01/2025 10:12 AM    CREATININE 0.90 07/01/2025 10:12 AM    GLUCOSE 100 07/01/2025 10:12 AM    CALCIUM 9.8 07/01/2025 10:12 AM    LABGLOM 66 07/01/2025 10:12 AM    LABGLOM >60 12/12/2023 11:10 AM      Hypertension Medications       ACE Inhibitors       ramipril (ALTACE) 10 MG capsule Take 1 capsule by mouth daily       Calcium Channel Blockers       amLODIPine (NORVASC) 5 MG tablet Take 1 tablet by mouth 2 times daily       Loop Diuretics       furosemide (LASIX) 20 MG tablet Take 1 tablet by mouth daily as needed (leg swelling) 1/2 tablet (10 mg) daily ONLY AS NEEDED for leg swelling.

## 2025-07-13 PROCEDURE — 93298 REM INTERROG DEV EVAL SCRMS: CPT | Performed by: INTERNAL MEDICINE

## 2025-08-21 ENCOUNTER — APPOINTMENT (OUTPATIENT)
Dept: URBAN - METROPOLITAN AREA CLINIC 329 | Facility: CLINIC | Age: 80
Setting detail: DERMATOLOGY
End: 2025-08-21

## 2025-08-21 DIAGNOSIS — D22 MELANOCYTIC NEVI: ICD-10-CM

## 2025-08-21 DIAGNOSIS — D18.0 HEMANGIOMA: ICD-10-CM

## 2025-08-21 DIAGNOSIS — L81.4 OTHER MELANIN HYPERPIGMENTATION: ICD-10-CM

## 2025-08-21 DIAGNOSIS — L82.1 OTHER SEBORRHEIC KERATOSIS: ICD-10-CM

## 2025-08-21 PROBLEM — D18.01 HEMANGIOMA OF SKIN AND SUBCUTANEOUS TISSUE: Status: ACTIVE | Noted: 2025-08-21

## 2025-08-21 PROBLEM — D22.5 MELANOCYTIC NEVI OF TRUNK: Status: ACTIVE | Noted: 2025-08-21

## 2025-08-21 PROCEDURE — ? COUNSELING

## 2025-08-21 ASSESSMENT — LOCATION DETAILED DESCRIPTION DERM
LOCATION DETAILED: INFERIOR THORACIC SPINE
LOCATION DETAILED: RIGHT MEDIAL UPPER BACK
LOCATION DETAILED: LEFT INFERIOR UPPER BACK
LOCATION DETAILED: LEFT MEDIAL UPPER BACK

## 2025-08-21 ASSESSMENT — LOCATION SIMPLE DESCRIPTION DERM
LOCATION SIMPLE: RIGHT UPPER BACK
LOCATION SIMPLE: UPPER BACK
LOCATION SIMPLE: LEFT UPPER BACK

## 2025-08-21 ASSESSMENT — LOCATION ZONE DERM: LOCATION ZONE: TRUNK

## (undated) DEVICE — 3M™ TEGADERM™ TRANSPARENT FILM DRESSING FRAME STYLE, 1626W, 4 IN X 4-3/4 IN (10 CM X 12 CM), 50/CT 4CT/CASE: Brand: 3M™ TEGADERM™

## (undated) DEVICE — REM POLYHESIVE ADULT PATIENT RETURN ELECTRODE: Brand: VALLEYLAB

## (undated) DEVICE — SUTURE VCRL SZ 3-0 L18IN ABSRB UD L26MM SH 1/2 CIR J864D

## (undated) DEVICE — Z DUP USE 2428697 DRESSING POSTOP AG PRISMASEAL 3.5X4IN

## (undated) DEVICE — APPLIER CLP L9.38IN M LIG TI DISP STR RNG HNDL LIGACLP

## (undated) DEVICE — Device

## (undated) DEVICE — GARMENT,MEDLINE,DVT,INT,CALF,MED, GEN2: Brand: MEDLINE

## (undated) DEVICE — SOLUTION IV 1000ML 0.9% SOD CHL

## (undated) DEVICE — WIRE CUTTER, STERILE (WCS144): Brand: CENTURION MEDICAL PRODUCTS CORP

## (undated) DEVICE — NEEDLE HYPO 21GA L1.5IN INTRAMUSCULAR S STL LATCH BVL UP

## (undated) DEVICE — AMD ANTIMICROBIAL GAUZE SPONGES,12 PLY USP TYPE VII, 0.2% POLYHEXAMETHYLENE BIGUANIDE HCI (PHMB): Brand: CURITY

## (undated) DEVICE — MONITOR CRD 1.4 CC 3.4 GM INSERTABLE LINQ

## (undated) DEVICE — TRAY PREP DRY W/ PREM GLV 2 APPL 6 SPNG 2 UNDPD 1 OVERWRAP

## (undated) DEVICE — MINOR SPLIT GENERAL: Brand: MEDLINE INDUSTRIES, INC.

## (undated) DEVICE — DRAPE TWL SURG 16X26IN BLU ORB04] ALLCARE INC]

## (undated) DEVICE — DRAPE,TOP,102X53,STERILE: Brand: MEDLINE

## (undated) DEVICE — SHEET, DRAPE, SPLIT, STERILE: Brand: MEDLINE